# Patient Record
Sex: MALE | Race: WHITE | Employment: FULL TIME | URBAN - METROPOLITAN AREA
[De-identification: names, ages, dates, MRNs, and addresses within clinical notes are randomized per-mention and may not be internally consistent; named-entity substitution may affect disease eponyms.]

---

## 2017-01-24 ENCOUNTER — OFFICE VISIT (OUTPATIENT)
Dept: FAMILY MEDICINE CLINIC | Facility: CLINIC | Age: 37
End: 2017-01-24

## 2017-01-24 VITALS
HEART RATE: 60 BPM | SYSTOLIC BLOOD PRESSURE: 104 MMHG | DIASTOLIC BLOOD PRESSURE: 58 MMHG | TEMPERATURE: 98 F | RESPIRATION RATE: 16 BRPM | WEIGHT: 180.38 LBS | HEIGHT: 72 IN | BODY MASS INDEX: 24.43 KG/M2

## 2017-01-24 DIAGNOSIS — G89.29 CHRONIC MIDLINE LOW BACK PAIN WITHOUT SCIATICA: ICD-10-CM

## 2017-01-24 DIAGNOSIS — M54.50 CHRONIC MIDLINE LOW BACK PAIN WITHOUT SCIATICA: ICD-10-CM

## 2017-01-24 DIAGNOSIS — Z00.00 ANNUAL PHYSICAL EXAM: Primary | ICD-10-CM

## 2017-01-24 DIAGNOSIS — Z13.220 LIPID SCREENING: ICD-10-CM

## 2017-01-24 PROCEDURE — 99385 PREV VISIT NEW AGE 18-39: CPT | Performed by: FAMILY MEDICINE

## 2017-01-24 RX ORDER — OMEPRAZOLE 40 MG/1
40 CAPSULE, DELAYED RELEASE ORAL DAILY
Refills: 2 | COMMUNITY
Start: 2016-12-30 | End: 2017-11-07

## 2017-01-24 NOTE — PROGRESS NOTES
Patient presents with:  Establish Care: New Patient Has seen [facility] Physician in the past     HPI:   Anisha Fabian is a 31year old male who presents for a complete physical exam. He is a new patient, recently changed from [facility] to [facility].     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 Children: 2 boys. 5, 3yo. Exercise: active. Regular exercise 4-5 times a week. Diet: \"good\". 5 meals a day.         REVIEW OF SYSTEMS:     All systems

## 2017-01-31 ENCOUNTER — APPOINTMENT (OUTPATIENT)
Dept: LAB | Age: 37
End: 2017-01-31
Attending: FAMILY MEDICINE
Payer: COMMERCIAL

## 2017-01-31 DIAGNOSIS — Z13.220 LIPID SCREENING: ICD-10-CM

## 2017-01-31 LAB
ALBUMIN SERPL-MCNC: 4.3 G/DL (ref 3.5–4.8)
ALP LIVER SERPL-CCNC: 67 U/L (ref 45–117)
ALT SERPL-CCNC: 24 U/L (ref 17–63)
AST SERPL-CCNC: 16 U/L (ref 15–41)
BILIRUB SERPL-MCNC: 0.6 MG/DL (ref 0.1–2)
BUN BLD-MCNC: 22 MG/DL (ref 8–20)
CALCIUM BLD-MCNC: 9.5 MG/DL (ref 8.3–10.3)
CHLORIDE: 106 MMOL/L (ref 101–111)
CHOLEST SMN-MCNC: 165 MG/DL (ref ?–200)
CO2: 30 MMOL/L (ref 22–32)
CREAT BLD-MCNC: 1.04 MG/DL (ref 0.7–1.3)
GLUCOSE BLD-MCNC: 80 MG/DL (ref 70–99)
HDLC SERPL-MCNC: 58 MG/DL (ref 45–?)
HDLC SERPL: 2.84 {RATIO} (ref ?–4.97)
LDLC SERPL CALC-MCNC: 100 MG/DL (ref ?–130)
M PROTEIN MFR SERPL ELPH: 7.3 G/DL (ref 6.1–8.3)
NONHDLC SERPL-MCNC: 107 MG/DL (ref ?–130)
POTASSIUM SERPL-SCNC: 4.4 MMOL/L (ref 3.6–5.1)
SODIUM SERPL-SCNC: 140 MMOL/L (ref 136–144)
TRIGLYCERIDES: 36 MG/DL (ref ?–150)
VLDL: 7 MG/DL (ref 5–40)

## 2017-01-31 PROCEDURE — 36415 COLL VENOUS BLD VENIPUNCTURE: CPT

## 2017-01-31 PROCEDURE — 80053 COMPREHEN METABOLIC PANEL: CPT

## 2017-01-31 PROCEDURE — 80061 LIPID PANEL: CPT

## 2017-03-14 ENCOUNTER — MED REC SCAN ONLY (OUTPATIENT)
Dept: FAMILY MEDICINE CLINIC | Facility: CLINIC | Age: 37
End: 2017-03-14

## 2017-09-18 ENCOUNTER — TELEPHONE (OUTPATIENT)
Dept: FAMILY MEDICINE CLINIC | Facility: CLINIC | Age: 37
End: 2017-09-18

## 2017-09-18 NOTE — TELEPHONE ENCOUNTER
[facility] Clinical Staff Cc: Mariia Siegel [place] Referral [place]   Phone Number: [phone]             Patient Name: Guanakito Domínguez   : [birth year]   Reason for the order/referral: Foot pain on both feet   PCP: Alex Maharaj   Refer to [facility]

## 2017-09-18 NOTE — TELEPHONE ENCOUNTER
Reviewed EPIC, patient never seen for pain in feet, LOV Jan 2017 for physical, no mention of feet pain at that time, no clinical notes to add to referral requested for podiatrist. Will need appointment in our office to evaluate foot pain and develop a plan

## 2017-09-18 NOTE — TELEPHONE ENCOUNTER
Spoke with patient to let him know that [facility] need [facility] He verbalized understanding and told me he will call us back as soon as it is complete.   He was grateful that we told him so he can get it done for hi

## 2017-09-26 ENCOUNTER — OFFICE VISIT (OUTPATIENT)
Dept: FAMILY MEDICINE CLINIC | Facility: CLINIC | Age: 37
End: 2017-09-26

## 2017-09-26 VITALS
BODY MASS INDEX: 25.47 KG/M2 | SYSTOLIC BLOOD PRESSURE: 100 MMHG | HEART RATE: 64 BPM | WEIGHT: 188 LBS | DIASTOLIC BLOOD PRESSURE: 78 MMHG | TEMPERATURE: 98 F | HEIGHT: 72 IN | RESPIRATION RATE: 14 BRPM

## 2017-09-26 DIAGNOSIS — M72.2 PLANTAR FASCIA SYNDROME: Primary | ICD-10-CM

## 2017-09-26 PROCEDURE — 99213 OFFICE O/P EST LOW 20 MIN: CPT | Performed by: FAMILY MEDICINE

## 2017-09-26 NOTE — PROGRESS NOTES
Patient presents with: Foot Pain: x2 months with bilateral foot pain. Pt states worse at arch, Pain level 7/10. HPI:   Alexandre Akers is a 30year old male who presents to the office for eval of bilateral foot pain. Very active, running, yardwork.

## 2017-11-07 RX ORDER — OMEPRAZOLE 40 MG/1
40 CAPSULE, DELAYED RELEASE ORAL DAILY
Qty: 90 CAPSULE | Refills: 2 | Status: SHIPPED | OUTPATIENT
Start: 2017-11-07 | End: 2018-11-13

## 2017-11-07 NOTE — TELEPHONE ENCOUNTER
Pt requesting his Omeprazole 40 MG Oral Capsule Delayed Release med. Sam Holliday Pt not sure if appt will be needed. Sam Holliday Please let him know. Sam Lewis

## 2017-11-07 NOTE — TELEPHONE ENCOUNTER
LOV for acute 9/26/2017 with Dr Giovany Schaeffer had physical 1/24/2017 we have never ordered this before will send to Dr Giovany Schaeffer to order first RX

## 2018-01-02 ENCOUNTER — OFFICE VISIT (OUTPATIENT)
Dept: FAMILY MEDICINE CLINIC | Facility: CLINIC | Age: 38
End: 2018-01-02

## 2018-01-02 VITALS
DIASTOLIC BLOOD PRESSURE: 60 MMHG | RESPIRATION RATE: 16 BRPM | WEIGHT: 193 LBS | HEIGHT: 73 IN | BODY MASS INDEX: 25.58 KG/M2 | TEMPERATURE: 99 F | HEART RATE: 60 BPM | SYSTOLIC BLOOD PRESSURE: 100 MMHG

## 2018-01-02 DIAGNOSIS — H93.8X3 EAR PRESSURE, BILATERAL: ICD-10-CM

## 2018-01-02 DIAGNOSIS — H93.8X3 EAR PRESSURE, BILATERAL: Primary | ICD-10-CM

## 2018-01-02 PROCEDURE — 99213 OFFICE O/P EST LOW 20 MIN: CPT | Performed by: FAMILY MEDICINE

## 2018-01-02 RX ORDER — FLUTICASONE PROPIONATE 50 MCG
2 SPRAY, SUSPENSION (ML) NASAL DAILY
Qty: 1 BOTTLE | Refills: 3 | Status: SHIPPED | OUTPATIENT
Start: 2018-01-02 | End: 2018-01-02

## 2018-01-02 NOTE — PROGRESS NOTES
Patient presents with:  Ear Problem: pressure in both ears x 2 weeks, occassional dizziness, never cleared up after travel     HPI:   Maggie Valles is a 30year old male who presents to the office for ear pressure.   Recently traveled quite a bit for wor

## 2018-01-03 RX ORDER — FLUTICASONE PROPIONATE 50 MCG
SPRAY, SUSPENSION (ML) NASAL
Qty: 3 BOTTLE | Refills: 1 | Status: SHIPPED | OUTPATIENT
Start: 2018-01-03 | End: 2018-04-18 | Stop reason: ALTCHOICE

## 2018-02-05 ENCOUNTER — OFFICE VISIT (OUTPATIENT)
Dept: FAMILY MEDICINE CLINIC | Facility: CLINIC | Age: 38
End: 2018-02-05

## 2018-02-05 VITALS
RESPIRATION RATE: 14 BRPM | WEIGHT: 192 LBS | SYSTOLIC BLOOD PRESSURE: 100 MMHG | TEMPERATURE: 98 F | BODY MASS INDEX: 24.9 KG/M2 | DIASTOLIC BLOOD PRESSURE: 60 MMHG | HEIGHT: 73.8 IN | HEART RATE: 64 BPM

## 2018-02-05 DIAGNOSIS — J06.9 VIRAL URI: Primary | ICD-10-CM

## 2018-02-05 PROCEDURE — 99213 OFFICE O/P EST LOW 20 MIN: CPT | Performed by: FAMILY MEDICINE

## 2018-02-05 NOTE — PROGRESS NOTES
Patient presents with:  URI: sons recently diagnosed with Flu and Pt afraid he got it from them  Body ache and/or chills: Fatigue- first noted Saturday night     HPI:   Lexie Bello is a 30year old male who presents to the office for URI.   Has 2 boys

## 2018-04-18 ENCOUNTER — OFFICE VISIT (OUTPATIENT)
Dept: FAMILY MEDICINE CLINIC | Facility: CLINIC | Age: 38
End: 2018-04-18

## 2018-04-18 VITALS
HEART RATE: 64 BPM | HEIGHT: 72 IN | RESPIRATION RATE: 10 BRPM | WEIGHT: 190.38 LBS | SYSTOLIC BLOOD PRESSURE: 90 MMHG | TEMPERATURE: 98 F | DIASTOLIC BLOOD PRESSURE: 62 MMHG | BODY MASS INDEX: 25.78 KG/M2

## 2018-04-18 DIAGNOSIS — Z23 NEED FOR DIPHTHERIA-TETANUS-PERTUSSIS (TDAP) VACCINE: ICD-10-CM

## 2018-04-18 DIAGNOSIS — Z00.00 ANNUAL PHYSICAL EXAM: Primary | ICD-10-CM

## 2018-04-18 DIAGNOSIS — M76.52 PATELLAR TENDINITIS OF LEFT KNEE: ICD-10-CM

## 2018-04-18 PROCEDURE — 90715 TDAP VACCINE 7 YRS/> IM: CPT | Performed by: FAMILY MEDICINE

## 2018-04-18 PROCEDURE — 99395 PREV VISIT EST AGE 18-39: CPT | Performed by: FAMILY MEDICINE

## 2018-04-18 PROCEDURE — 90471 IMMUNIZATION ADMIN: CPT | Performed by: FAMILY MEDICINE

## 2018-04-18 NOTE — PROGRESS NOTES
Patient presents with:  Physical: left knee      HPI:   Katty Barragan is a 41year old male who presents for a complete physical exam.     Last colonoscopy:  n/a  Last PSA:  n/a  Immunizations: TDaP due today.   Has never had flu before, but in future co Maternal Grandfather       Social History:  Smoking status: Former Smoker                                                              Packs/day: 0.00      Years: 0.00      Smokeless tobacco: Never Used                      Alcohol use: Yes           0.0 o Patellar tendinitis of left knee  PT should help. Overuse injury.  - OP REFERRAL TO [facility]    3.  Need for diphtheria-tetanus-pertussis (Tdap) vaccine  Given.  - TETANUS, DIPHTHERIA TOXOIDS AND ACELLULAR PERTUSIS VACCINE (TDAP), >7

## 2018-05-14 ENCOUNTER — APPOINTMENT (OUTPATIENT)
Dept: PHYSICAL THERAPY | Facility: HOSPITAL | Age: 38
End: 2018-05-14
Attending: FAMILY MEDICINE
Payer: COMMERCIAL

## 2018-05-16 ENCOUNTER — APPOINTMENT (OUTPATIENT)
Dept: PHYSICAL THERAPY | Facility: HOSPITAL | Age: 38
End: 2018-05-16
Attending: FAMILY MEDICINE
Payer: COMMERCIAL

## 2018-05-23 ENCOUNTER — OFFICE VISIT (OUTPATIENT)
Dept: PHYSICAL THERAPY | Facility: HOSPITAL | Age: 38
End: 2018-05-23
Attending: FAMILY MEDICINE
Payer: COMMERCIAL

## 2018-05-23 DIAGNOSIS — M76.52 PATELLAR TENDINITIS OF LEFT KNEE: ICD-10-CM

## 2018-05-23 PROCEDURE — 97110 THERAPEUTIC EXERCISES: CPT

## 2018-05-23 PROCEDURE — 97161 PT EVAL LOW COMPLEX 20 MIN: CPT

## 2018-05-23 NOTE — PROGRESS NOTES
LOWER EXTREMITY EVALUATION:   Referring Physician: Dr. Valentine Whitmore  Date of Onset: December 2017 Date of Service: 5/23/2018   Diagnosis: Patellar tendinitis, left.   PATIENT SUMMARY:   Lorena Marquez is a 41year old y/o male who presents to therapy today wit Lt. knee  Sensation: Intact    Flexibility:   Hip Flexor: R= minimally restricted; L= minimally restricted  Hamstrings: R= -35 deg; L= -50 deg  Piriformis: R=WNL; L= Min/Mod restricted  Quads: R=WNL; L= Minimally restricted    ROM, Strength/MMT: *=discomfo Conservative modalities, and Home Exercise Program instruction    Education or treatment limitation: None  Rehab Potential: good    FOTO: 70/100(30% limitation)  Current status G Code: Initial Mobility: Walking and Moving Around CJ: 20-39% impaired, limite

## 2018-05-24 ENCOUNTER — APPOINTMENT (OUTPATIENT)
Dept: PHYSICAL THERAPY | Facility: HOSPITAL | Age: 38
End: 2018-05-24
Attending: FAMILY MEDICINE
Payer: COMMERCIAL

## 2018-05-29 ENCOUNTER — APPOINTMENT (OUTPATIENT)
Dept: PHYSICAL THERAPY | Facility: HOSPITAL | Age: 38
End: 2018-05-29
Attending: FAMILY MEDICINE
Payer: COMMERCIAL

## 2018-05-31 ENCOUNTER — APPOINTMENT (OUTPATIENT)
Dept: PHYSICAL THERAPY | Facility: HOSPITAL | Age: 38
End: 2018-05-31
Attending: FAMILY MEDICINE
Payer: COMMERCIAL

## 2018-05-31 DIAGNOSIS — M76.52 PATELLAR TENDINITIS OF LEFT KNEE: ICD-10-CM

## 2018-05-31 PROCEDURE — 97110 THERAPEUTIC EXERCISES: CPT

## 2018-05-31 NOTE — PROGRESS NOTES
Diagnosis:  Patellar tendinitis, left  [place]ized [place] of [place]  8 [place]    Next MD visit: none scheduled  Fall Risk: standard         Precautions: none   Medication Changes since last visit?: No  Subjective: Pt states left knee today is 2/10.  He states

## 2018-06-06 ENCOUNTER — OFFICE VISIT (OUTPATIENT)
Dept: PHYSICAL THERAPY | Facility: HOSPITAL | Age: 38
End: 2018-06-06
Attending: FAMILY MEDICINE
Payer: COMMERCIAL

## 2018-06-06 DIAGNOSIS — M76.52 PATELLAR TENDINITIS OF LEFT KNEE: ICD-10-CM

## 2018-06-06 PROCEDURE — 97110 THERAPEUTIC EXERCISES: CPT

## 2018-06-06 NOTE — PROGRESS NOTES
Diagnosis:  Patellar tendinitis, left  Authorized # of Visits:  8 ([facility] [place]    Next MD visit: none scheduled  Fall Risk: standard         Precautions: none   Medication Changes since last visit?: No  Subjective: Pt states left knee is doing well and denies functional mobility with stairs and running.      Charges: TEx3       Total Timed Treatment: 40 min  Total Treatment Time: 41 min

## 2018-06-07 ENCOUNTER — APPOINTMENT (OUTPATIENT)
Dept: PHYSICAL THERAPY | Facility: HOSPITAL | Age: 38
End: 2018-06-07
Attending: FAMILY MEDICINE
Payer: COMMERCIAL

## 2018-06-11 ENCOUNTER — TELEPHONE (OUTPATIENT)
Dept: FAMILY MEDICINE CLINIC | Facility: CLINIC | Age: 38
End: 2018-06-11

## 2018-06-11 NOTE — TELEPHONE ENCOUNTER
Spoke with Dr Pablo Patel. Dr Pablo Patel states there is no risk noted in this area. Dr Pablo Patel advises Pt to be alert for any symptoms, be cautious for a few weeks and to notify office if there any changes. Explained this to Pt.  Patient verbalized understanding and

## 2018-06-11 NOTE — TELEPHONE ENCOUNTER
Patient was bit by mosquitoes in [place] and is concerned because his wife is in her first trimester. Does he need to be seen?

## 2018-06-12 ENCOUNTER — TELEPHONE (OUTPATIENT)
Dept: FAMILY MEDICINE CLINIC | Facility: CLINIC | Age: 38
End: 2018-06-12

## 2018-06-12 ENCOUNTER — OFFICE VISIT (OUTPATIENT)
Dept: PHYSICAL THERAPY | Facility: HOSPITAL | Age: 38
End: 2018-06-12
Attending: FAMILY MEDICINE
Payer: COMMERCIAL

## 2018-06-12 DIAGNOSIS — M76.52 PATELLAR TENDINITIS OF LEFT KNEE: ICD-10-CM

## 2018-06-12 PROCEDURE — 97110 THERAPEUTIC EXERCISES: CPT

## 2018-06-12 NOTE — TELEPHONE ENCOUNTER
Will ask Dr Reilly Kohler about testing for the Zika virus?  Will he need to have this done at the [facility] I cannot find the testing for this in our data base

## 2018-06-12 NOTE — TELEPHONE ENCOUNTER
Called and talked to patient gave him the information we had and suggested he consider following up with the Pending sale to [facility] department.  He will consider this

## 2018-06-12 NOTE — TELEPHONE ENCOUNTER
There is currently no risk of zika virus in the [place], even [place], [place], [place]. It looks like any zika testing would be through the [facility].

## 2018-06-12 NOTE — TELEPHONE ENCOUNTER
Patient spoke to a nurse yesterday (see telephone encounter 6/11/18) in regards to mosquitoes bites. Patient is now wanting an order placed to get tested for zika virus to give him a piece of mind.

## 2018-06-12 NOTE — PROGRESS NOTES
Diagnosis:  Patellar tendinitis, left  Authorized [place] of [place]  [place] [place]    Next MD visit: none scheduled  Fall Risk: standard         Precautions: none   Medication Changes since last visit?: No  Subjective: Pt states left knee is doing well and denies Sx management and progress toward greater/painfree functional independence.      Plan: re-assess next visit    Charges: TEx3       Total Timed Treatment: 40 min  Total Treatment Time: 41 min

## 2018-06-15 ENCOUNTER — APPOINTMENT (OUTPATIENT)
Dept: PHYSICAL THERAPY | Facility: HOSPITAL | Age: 38
End: 2018-06-15
Attending: FAMILY MEDICINE
Payer: COMMERCIAL

## 2018-06-19 ENCOUNTER — APPOINTMENT (OUTPATIENT)
Dept: PHYSICAL THERAPY | Facility: HOSPITAL | Age: 38
End: 2018-06-19
Attending: FAMILY MEDICINE
Payer: COMMERCIAL

## 2018-06-22 ENCOUNTER — OFFICE VISIT (OUTPATIENT)
Dept: PHYSICAL THERAPY | Facility: HOSPITAL | Age: 38
End: 2018-06-22
Attending: FAMILY MEDICINE
Payer: COMMERCIAL

## 2018-06-22 DIAGNOSIS — M76.52 PATELLAR TENDINITIS OF LEFT KNEE: ICD-10-CM

## 2018-06-22 PROCEDURE — 97110 THERAPEUTIC EXERCISES: CPT

## 2018-06-22 NOTE — PROGRESS NOTES
Diagnosis:  Patellar tendinitis, left  Authorized # of Visits:  8 ([facility] [place]    Next MD visit: none scheduled  Fall Risk: standard         Precautions: none   Medication Changes since last visit?: No  Subjective: Pt reports feeling pretty good with no signi and no pain with activities. Able to progress exercises without increased pain however quad weakness noted. Goals:   1. Progress Lt.  Knee flxn strength to 5/5 throughout so that patient may have sufficient strength to tolerate stairs without increased p

## 2018-06-27 ENCOUNTER — OFFICE VISIT (OUTPATIENT)
Dept: PHYSICAL THERAPY | Facility: HOSPITAL | Age: 38
End: 2018-06-27
Attending: FAMILY MEDICINE
Payer: COMMERCIAL

## 2018-06-27 PROCEDURE — 97110 THERAPEUTIC EXERCISES: CPT

## 2018-07-27 NOTE — PROGRESS NOTES
PLEASE NOTE: Patient was last seen in therapy on 6/27/18. He subsequently did not show for f/u appts. Patient is being discharged from our active files at this time.

## 2018-08-17 ENCOUNTER — OFFICE VISIT (OUTPATIENT)
Dept: FAMILY MEDICINE CLINIC | Facility: CLINIC | Age: 38
End: 2018-08-17
Payer: COMMERCIAL

## 2018-08-17 VITALS
TEMPERATURE: 98 F | RESPIRATION RATE: 16 BRPM | HEIGHT: 72.25 IN | DIASTOLIC BLOOD PRESSURE: 62 MMHG | SYSTOLIC BLOOD PRESSURE: 100 MMHG | BODY MASS INDEX: 25.05 KG/M2 | WEIGHT: 187 LBS | HEART RATE: 56 BPM

## 2018-08-17 DIAGNOSIS — M25.511 ACUTE PAIN OF RIGHT SHOULDER: Primary | ICD-10-CM

## 2018-08-17 PROCEDURE — 99213 OFFICE O/P EST LOW 20 MIN: CPT | Performed by: FAMILY MEDICINE

## 2018-08-17 NOTE — PROGRESS NOTES
Patient presents with:  Shoulder Pain: R shoulder pain for the past week after doing shoulder presses with dumb bells. Has iced and taken ibuprofen with some relief.  Pain level 7 ROM is limited     HPI:   Hong Lindo is a 41year old male who presents

## 2018-09-04 ENCOUNTER — PATIENT MESSAGE (OUTPATIENT)
Dept: FAMILY MEDICINE CLINIC | Facility: CLINIC | Age: 38
End: 2018-09-04

## 2018-09-04 DIAGNOSIS — M25.511 ACUTE PAIN OF RIGHT SHOULDER: Primary | ICD-10-CM

## 2018-09-04 NOTE — TELEPHONE ENCOUNTER
From: Maggie Valles  To: Catarina Lezama MD  Sent: 9/4/2018 4:26 PM CDT  Subject: Non-Urgent Medical Question    I visited Dr. Presley Meza last week for shoulder pain per Dr. Para Fleischer referral. Dr. Presley Meza said physical therapy would be up to me and I've decide

## 2018-09-25 ENCOUNTER — HOSPITAL ENCOUNTER (OUTPATIENT)
Dept: PHYSICAL THERAPY | Facility: HOSPITAL | Age: 38
Setting detail: THERAPIES SERIES
Discharge: HOME | End: 2018-09-25
Attending: FAMILY MEDICINE
Payer: COMMERCIAL

## 2018-09-25 DIAGNOSIS — M25.511 ACUTE PAIN OF RIGHT SHOULDER: ICD-10-CM

## 2018-09-25 PROCEDURE — 97162 PT EVAL MOD COMPLEX 30 MIN: CPT

## 2018-09-25 PROCEDURE — 97530 THERAPEUTIC ACTIVITIES: CPT

## 2018-09-25 NOTE — PROGRESS NOTES
UPPER EXTREMITY EVALUATION:   Referring Physician: Dr. Bravo Isabel (PCP)  Cc: Dr. Silvana Harris (Ortho)  Diagnosis: Acute pain of right shoulder (M25.511)     Date of Service: 9/25/2018     PATIENT SUMMARY   Erasmo Burris is a 41year old y/o male who present elevation, winging/tipping, and adduction on L (hx of injury to that side in past); mild scapular tipping/winging on R  Cervical Screen:  unremarkable  Palpation: mild tenderness to palpation over bicep tendon and supraspinatus tendon on R  Sensation: unre Therapeutic Exercises; Neuromuscular Re-education; Therapeutic Activity; Kinesiology Taping; GRASTON Soft Tissue Mobilization; Pt education; Home exercise program instructions; Modalities as needed.     Education or treatment limitation: None  [facility]

## 2018-09-27 ENCOUNTER — APPOINTMENT (OUTPATIENT)
Dept: PHYSICAL THERAPY | Facility: HOSPITAL | Age: 38
End: 2018-09-27
Attending: FAMILY MEDICINE
Payer: COMMERCIAL

## 2018-10-02 ENCOUNTER — HOSPITAL ENCOUNTER (OUTPATIENT)
Dept: PHYSICAL THERAPY | Facility: HOSPITAL | Age: 38
Setting detail: THERAPIES SERIES
Discharge: HOME | End: 2018-10-02
Attending: FAMILY MEDICINE
Payer: COMMERCIAL

## 2018-10-02 PROCEDURE — 97110 THERAPEUTIC EXERCISES: CPT

## 2018-10-02 NOTE — PROGRESS NOTES
Dx: Acute pain of R shoulder         Authorized # of Visits:  NA         Next MD visit: none scheduled  Fall Risk: standard         Precautions: n/a             Subjective: Patient reports no significant change since initial evaluation last week.     Object

## 2018-10-04 ENCOUNTER — HOSPITAL ENCOUNTER (OUTPATIENT)
Dept: PHYSICAL THERAPY | Facility: HOSPITAL | Age: 38
Setting detail: THERAPIES SERIES
Discharge: HOME | End: 2018-10-04
Attending: FAMILY MEDICINE
Payer: COMMERCIAL

## 2018-10-04 PROCEDURE — 97110 THERAPEUTIC EXERCISES: CPT

## 2018-10-04 NOTE — PROGRESS NOTES
Dx: Acute pain of R shoulder         Authorized # of Visits:  NA         Next MD visit: none scheduled  Fall Risk: standard         Precautions: n/a             Subjective: Patient reports the shoulder has been doing okay. Tolerating HEP without pain.

## 2018-10-09 ENCOUNTER — HOSPITAL ENCOUNTER (OUTPATIENT)
Dept: PHYSICAL THERAPY | Facility: HOSPITAL | Age: 38
Setting detail: THERAPIES SERIES
Discharge: HOME | End: 2018-10-09
Attending: FAMILY MEDICINE
Payer: COMMERCIAL

## 2018-10-09 PROCEDURE — 97110 THERAPEUTIC EXERCISES: CPT

## 2018-10-09 NOTE — PROGRESS NOTES
Dx: Acute pain of R shoulder         Authorized # of Visits:  NA         Next MD visit: none scheduled  Fall Risk: standard         Precautions: n/a             Subjective: Patient reports shoulder has been feeling a little better and is tolerating HEP wel

## 2018-10-11 ENCOUNTER — APPOINTMENT (OUTPATIENT)
Dept: PHYSICAL THERAPY | Facility: HOSPITAL | Age: 38
End: 2018-10-11
Attending: FAMILY MEDICINE
Payer: COMMERCIAL

## 2018-10-16 ENCOUNTER — APPOINTMENT (OUTPATIENT)
Dept: PHYSICAL THERAPY | Facility: HOSPITAL | Age: 38
End: 2018-10-16
Attending: FAMILY MEDICINE
Payer: COMMERCIAL

## 2018-10-18 ENCOUNTER — HOSPITAL ENCOUNTER (OUTPATIENT)
Dept: PHYSICAL THERAPY | Facility: HOSPITAL | Age: 38
Setting detail: THERAPIES SERIES
Discharge: HOME | End: 2018-10-18
Attending: FAMILY MEDICINE
Payer: COMMERCIAL

## 2018-10-18 PROCEDURE — 97110 THERAPEUTIC EXERCISES: CPT

## 2018-11-02 ENCOUNTER — NURSE ONLY (OUTPATIENT)
Dept: FAMILY MEDICINE CLINIC | Facility: CLINIC | Age: 38
End: 2018-11-02
Payer: COMMERCIAL

## 2018-11-02 VITALS — TEMPERATURE: 99 F

## 2018-11-02 DIAGNOSIS — Z23 NEED FOR IMMUNIZATION AGAINST INFLUENZA: Primary | ICD-10-CM

## 2018-11-02 NOTE — PROGRESS NOTES
Pt came in for his flu shot. Temp checked. VIS sheet given. Consent completed. Vaccine given in right deltoid. Pt tolerated well.

## 2018-11-06 ENCOUNTER — HOSPITAL ENCOUNTER (OUTPATIENT)
Dept: PHYSICAL THERAPY | Facility: HOSPITAL | Age: 38
Setting detail: THERAPIES SERIES
Discharge: HOME | End: 2018-11-06
Attending: FAMILY MEDICINE
Payer: COMMERCIAL

## 2018-11-06 PROCEDURE — 97110 THERAPEUTIC EXERCISES: CPT

## 2018-11-06 NOTE — PROGRESS NOTES
Dx: Acute pain of R shoulder         Authorized # of Visits:  NA         Next MD visit: none scheduled  Fall Risk: standard         Precautions: n/a             Subjective: Patient reports shoulder has been doing fairly well.   Notes he still gets an occasi post capsule stretch 2x30\" 6\" box UE lsu and over x10 Body blade up/down flxn/ext 2x30\" SLB on bosu with body blade flxn/ext 2x30\" bosu SLS body blade w/ AROM: up/down, side-to-side, D2 flxn/ext 2x45\" each     3# flxn 2x10 6\" box fsu/d x10 Body blade

## 2018-11-13 RX ORDER — OMEPRAZOLE 40 MG/1
CAPSULE, DELAYED RELEASE ORAL
Qty: 90 CAPSULE | Refills: 0 | Status: SHIPPED | OUTPATIENT
Start: 2018-11-13 | End: 2018-12-20 | Stop reason: ALTCHOICE

## 2018-11-13 NOTE — TELEPHONE ENCOUNTER
Refill request sent from pharmacy for Omeprazole. LOV 08/17/18. Will you approve refill ? Routed to Dr Valentine Whitmore.

## 2018-11-14 ENCOUNTER — HOSPITAL ENCOUNTER (OUTPATIENT)
Dept: PHYSICAL THERAPY | Facility: HOSPITAL | Age: 38
Setting detail: THERAPIES SERIES
Discharge: HOME | End: 2018-11-14
Attending: FAMILY MEDICINE
Payer: COMMERCIAL

## 2018-11-14 PROCEDURE — 97110 THERAPEUTIC EXERCISES: CPT

## 2018-11-15 NOTE — PROGRESS NOTES
Dx: Acute pain of R shoulder         Authorized # of Visits:  NA         Next MD visit: none scheduled  Fall Risk: standard         Precautions: n/a             Subjective: Patient reports shoulder has been doing well and he was able to do some light overh ABC 2 sets TRX shldr flxn to plank 2x10 TRX shldr flxn to plank 2x10 9# plank row 2x10    Corner pec stretch x30\" RTB spiderman wall walk x4 laps Body blade D2 flxn/ext 2x30\" DC 15# D2 flxn 2x10 Bosu R/L shift push up 2x8 7# plank w/ alt UE ext 2x8    ho

## 2018-11-19 ENCOUNTER — APPOINTMENT (OUTPATIENT)
Dept: PHYSICAL THERAPY | Facility: HOSPITAL | Age: 38
End: 2018-11-19
Attending: FAMILY MEDICINE
Payer: COMMERCIAL

## 2018-11-20 PROCEDURE — 90471 IMMUNIZATION ADMIN: CPT | Performed by: FAMILY MEDICINE

## 2018-11-20 PROCEDURE — 90686 IIV4 VACC NO PRSV 0.5 ML IM: CPT | Performed by: FAMILY MEDICINE

## 2018-12-05 ENCOUNTER — PATIENT MESSAGE (OUTPATIENT)
Dept: FAMILY MEDICINE CLINIC | Facility: CLINIC | Age: 38
End: 2018-12-05

## 2018-12-05 DIAGNOSIS — R12 HEARTBURN: Primary | ICD-10-CM

## 2018-12-05 NOTE — TELEPHONE ENCOUNTER
From: Hong Lindo  To: Brittany Pendleton MD  Sent: 12/5/2018 4:41 PM CST  Subject: Non-Urgent Medical Question    My heartburn and indigestion have been bad lately and as you know I take 40mg Omeprazole daily for it.  No changes to my diet or health an

## 2018-12-18 ENCOUNTER — TELEPHONE (OUTPATIENT)
Dept: FAMILY MEDICINE CLINIC | Facility: CLINIC | Age: 38
End: 2018-12-18

## 2018-12-18 NOTE — TELEPHONE ENCOUNTER
TC to patient to let him know referral for Dr. Kina Lewis M.D.is still current and may return for a visit to see him.

## 2018-12-23 ENCOUNTER — PATIENT MESSAGE (OUTPATIENT)
Dept: FAMILY MEDICINE CLINIC | Facility: CLINIC | Age: 38
End: 2018-12-23

## 2018-12-23 DIAGNOSIS — M54.5 CHRONIC LOW BACK PAIN, UNSPECIFIED BACK PAIN LATERALITY, WITH SCIATICA PRESENCE UNSPECIFIED: Primary | ICD-10-CM

## 2018-12-23 DIAGNOSIS — G89.29 CHRONIC LOW BACK PAIN, UNSPECIFIED BACK PAIN LATERALITY, WITH SCIATICA PRESENCE UNSPECIFIED: Primary | ICD-10-CM

## 2018-12-26 NOTE — TELEPHONE ENCOUNTER
From: Jefferson Dumont  To: Meg Covarrubias MD  Sent: 12/23/2018 9:10 AM CST  Subject: Referral Request    Good morning -    Would you please provide a referral for me to see Dr. Ward Lopez?  He is an in-network orthopedic surgeon specializing in back

## 2019-01-04 ENCOUNTER — APPOINTMENT (OUTPATIENT)
Dept: LAB | Facility: HOSPITAL | Age: 39
End: 2019-01-04
Attending: INTERNAL MEDICINE
Payer: COMMERCIAL

## 2019-01-04 DIAGNOSIS — R17 JAUNDICE: ICD-10-CM

## 2019-01-04 LAB
ALBUMIN SERPL-MCNC: 4.1 G/DL (ref 3.1–4.5)
ALBUMIN/GLOB SERPL: 1.2 {RATIO} (ref 1–2)
ALP LIVER SERPL-CCNC: 61 U/L (ref 45–117)
ALT SERPL-CCNC: 24 U/L (ref 17–63)
ANION GAP SERPL CALC-SCNC: 4 MMOL/L (ref 0–18)
AST SERPL-CCNC: 17 U/L (ref 15–41)
BILIRUB SERPL-MCNC: 0.8 MG/DL (ref 0.1–2)
BUN BLD-MCNC: 15 MG/DL (ref 8–20)
BUN/CREAT SERPL: 16.1 (ref 10–20)
CALCIUM BLD-MCNC: 9.4 MG/DL (ref 8.3–10.3)
CHLORIDE SERPL-SCNC: 106 MMOL/L (ref 101–111)
CO2 SERPL-SCNC: 29 MMOL/L (ref 22–32)
CREAT BLD-MCNC: 0.93 MG/DL (ref 0.7–1.3)
GLOBULIN PLAS-MCNC: 3.3 G/DL (ref 2.8–4.4)
GLUCOSE BLD-MCNC: 92 MG/DL (ref 70–99)
M PROTEIN MFR SERPL ELPH: 7.4 G/DL (ref 6.4–8.2)
OSMOLALITY SERPL CALC.SUM OF ELEC: 288 MOSM/KG (ref 275–295)
POTASSIUM SERPL-SCNC: 3.9 MMOL/L (ref 3.6–5.1)
SODIUM SERPL-SCNC: 139 MMOL/L (ref 136–144)

## 2019-01-04 PROCEDURE — 80053 COMPREHEN METABOLIC PANEL: CPT

## 2019-01-04 PROCEDURE — 36415 COLL VENOUS BLD VENIPUNCTURE: CPT

## 2019-02-05 ENCOUNTER — PATIENT MESSAGE (OUTPATIENT)
Dept: FAMILY MEDICINE CLINIC | Facility: CLINIC | Age: 39
End: 2019-02-05

## 2019-02-05 DIAGNOSIS — Z30.09 VASECTOMY EVALUATION: Primary | ICD-10-CM

## 2019-02-05 NOTE — TELEPHONE ENCOUNTER
From: Enoc Blake  To: Sabi Collazo MD  Sent: 2/5/2019 4:18 PM CST  Subject: Referral Request    Hi, there -    I'd like to meet with a urologist, Dr. Laura Adkins, to discuss a vasectomy. If possible, please put in a referral for me.     Thank

## 2019-02-11 NOTE — PROGRESS NOTES
This provider is out-of our network, Referral PENDING for Dr Parth Perry approval    Vimal Cox.  [place], [address]  Office Phone: [phone]

## 2019-05-24 ENCOUNTER — TELEPHONE (OUTPATIENT)
Dept: FAMILY MEDICINE CLINIC | Facility: CLINIC | Age: 39
End: 2019-05-24

## 2019-05-24 NOTE — TELEPHONE ENCOUNTER
Patient had a vasectomy 2 months ago and is wondering if he can do the post vasectomy semen analysis with us as his surgeon is in [place] and it's getting to be a pain to get out there

## 2019-05-24 NOTE — TELEPHONE ENCOUNTER
Called and talked to patient told him that we do not do this ans besides his surgeon wants to know if the surgery has worked.  So he will continue with the surgeon

## 2019-08-09 ENCOUNTER — OFFICE VISIT (OUTPATIENT)
Dept: FAMILY MEDICINE CLINIC | Facility: CLINIC | Age: 39
End: 2019-08-09
Payer: COMMERCIAL

## 2019-08-09 VITALS
DIASTOLIC BLOOD PRESSURE: 62 MMHG | WEIGHT: 175.63 LBS | HEART RATE: 64 BPM | SYSTOLIC BLOOD PRESSURE: 108 MMHG | RESPIRATION RATE: 14 BRPM | BODY MASS INDEX: 23.79 KG/M2 | HEIGHT: 72 IN | TEMPERATURE: 98 F

## 2019-08-09 DIAGNOSIS — M54.6 CHRONIC BILATERAL THORACIC BACK PAIN: Primary | ICD-10-CM

## 2019-08-09 DIAGNOSIS — G89.29 CHRONIC BILATERAL THORACIC BACK PAIN: Primary | ICD-10-CM

## 2019-08-09 PROCEDURE — 99213 OFFICE O/P EST LOW 20 MIN: CPT | Performed by: NURSE PRACTITIONER

## 2019-08-09 RX ORDER — METHYLPREDNISOLONE 4 MG/1
TABLET ORAL
Qty: 1 KIT | Refills: 0 | Status: SHIPPED | OUTPATIENT
Start: 2019-08-09 | End: 2019-09-19 | Stop reason: ALTCHOICE

## 2019-08-09 NOTE — PROGRESS NOTES
Patient presents with:  Back Pain: Low to mid back pain      HPI:  Presents approx 8 month history of mid thoracic back pain \"right next to spine\" that is presents constantly at very low level- reports 2/10.  Stated pain seems to increase some with flexio therapy for eval and treat. Instructed to notify office if not improved with these measures or if symptoms worsen. Verbalized understanding of instructions and agreeable to this plan of care.         No orders of the defined types were placed in this encoun

## 2019-09-19 ENCOUNTER — OFFICE VISIT (OUTPATIENT)
Dept: FAMILY MEDICINE CLINIC | Facility: CLINIC | Age: 39
End: 2019-09-19
Payer: COMMERCIAL

## 2019-09-19 VITALS
DIASTOLIC BLOOD PRESSURE: 68 MMHG | SYSTOLIC BLOOD PRESSURE: 112 MMHG | BODY MASS INDEX: 24.76 KG/M2 | HEART RATE: 60 BPM | HEIGHT: 72 IN | WEIGHT: 182.81 LBS | TEMPERATURE: 98 F | RESPIRATION RATE: 14 BRPM

## 2019-09-19 DIAGNOSIS — H92.02 LEFT EAR PAIN: Primary | ICD-10-CM

## 2019-09-19 DIAGNOSIS — H91.93 BILATERAL HEARING LOSS, UNSPECIFIED HEARING LOSS TYPE: ICD-10-CM

## 2019-09-19 PROCEDURE — 99214 OFFICE O/P EST MOD 30 MIN: CPT | Performed by: NURSE PRACTITIONER

## 2019-09-19 RX ORDER — FLUTICASONE PROPIONATE 50 MCG
1 SPRAY, SUSPENSION (ML) NASAL 2 TIMES DAILY
Qty: 1 BOTTLE | Refills: 0 | Status: SHIPPED | OUTPATIENT
Start: 2019-09-19 | End: 2021-05-18

## 2019-09-19 RX ORDER — PREDNISONE 20 MG/1
40 TABLET ORAL DAILY
Qty: 10 TABLET | Refills: 0 | Status: SHIPPED | OUTPATIENT
Start: 2019-09-19 | End: 2020-12-28 | Stop reason: ALTCHOICE

## 2019-09-19 NOTE — PATIENT INSTRUCTIONS
Take prednisone 2 tabs (at the same time) daily. After complete prednisone start Flonase (fluticasone) one spray each nostril twice daily. See Dr. Tete Brown as referred.

## 2019-09-19 NOTE — PROGRESS NOTES
Patient presents with:  Ear Problem: Pt's left ear has felt \"clogged\" and tender to the touch for the past 3 days. HPI:  Presents with approx 2 month history of left ear congestion, \"clogged\" feeling, tenderness behind left ear.  Also c/o several or masses. Eyes: Conjunctivae are pink and moist without exudate or drainage. Lymphadenopathy: No anterior or posterior cervical adenopathy. Cardiovascular: Normal rate, regular rhythm. No murmur. Pulmonary/Chest: No respiratory distress.  Effort no

## 2020-06-25 PROBLEM — M77.8 TENDONITIS OF WRIST, RIGHT: Status: ACTIVE | Noted: 2020-06-25

## 2020-06-25 PROBLEM — M24.131 DEGENERATIVE TFCC TEAR, RIGHT: Status: ACTIVE | Noted: 2020-06-25

## 2020-06-25 PROBLEM — M25.642 FINGER STIFFNESS, LEFT: Status: ACTIVE | Noted: 2020-06-25

## 2020-12-15 ENCOUNTER — HOSPITAL ENCOUNTER (EMERGENCY)
Facility: HOSPITAL | Age: 40
Discharge: HOME | End: 2020-12-15
Attending: EMERGENCY MEDICINE
Payer: COMMERCIAL

## 2020-12-15 VITALS
BODY MASS INDEX: 24 KG/M2 | WEIGHT: 180 LBS | OXYGEN SATURATION: 100 % | RESPIRATION RATE: 16 BRPM | HEART RATE: 72 BPM | DIASTOLIC BLOOD PRESSURE: 88 MMHG | TEMPERATURE: 99 F | SYSTOLIC BLOOD PRESSURE: 137 MMHG

## 2020-12-15 DIAGNOSIS — S61.211A LACERATION OF LEFT INDEX FINGER WITHOUT FOREIGN BODY WITHOUT DAMAGE TO NAIL, INITIAL ENCOUNTER: Primary | ICD-10-CM

## 2020-12-15 PROCEDURE — 99283 EMERGENCY DEPT VISIT LOW MDM: CPT

## 2020-12-15 PROCEDURE — 12001 RPR S/N/AX/GEN/TRNK 2.5CM/<: CPT

## 2020-12-15 PROCEDURE — 90471 IMMUNIZATION ADMIN: CPT

## 2020-12-15 NOTE — ED PROVIDER NOTES
Patient Seen in: [facility] Emergency Department      History   Patient presents with:  Laceration/Abrasion    Stated Complaint: FINGER LACERATION    HPI    The patient is a 47-year-old previous healthy male presenting to the emergency department du deformity. No bony tenderness of the left index finger. Normal range of motion of the MCP, PIP and DIP. Distal sensation is intact per normal distal color. Cap refill is normal.  Normal gait.   Skin: There is approximately a 2 cm linear horizontally eileen

## 2020-12-16 ENCOUNTER — TELEPHONE (OUTPATIENT)
Dept: FAMILY MEDICINE CLINIC | Facility: CLINIC | Age: 40
End: 2020-12-16

## 2020-12-16 NOTE — TELEPHONE ENCOUNTER
Spoke with patient after he was seen in the ER yesterday. I reminded him that he would need to come in to get his sutures removed.  Patient stated he would call back later to make an appointment for  a visit

## 2020-12-28 ENCOUNTER — OFFICE VISIT (OUTPATIENT)
Dept: FAMILY MEDICINE CLINIC | Facility: CLINIC | Age: 40
End: 2020-12-28
Payer: COMMERCIAL

## 2020-12-28 VITALS
RESPIRATION RATE: 16 BRPM | DIASTOLIC BLOOD PRESSURE: 74 MMHG | BODY MASS INDEX: 25.73 KG/M2 | HEART RATE: 64 BPM | TEMPERATURE: 98 F | HEIGHT: 72 IN | WEIGHT: 190 LBS | SYSTOLIC BLOOD PRESSURE: 118 MMHG

## 2020-12-28 DIAGNOSIS — S61.211A LACERATION OF LEFT INDEX FINGER WITHOUT FOREIGN BODY WITHOUT DAMAGE TO NAIL, INITIAL ENCOUNTER: Primary | ICD-10-CM

## 2020-12-28 PROCEDURE — 3078F DIAST BP <80 MM HG: CPT | Performed by: FAMILY MEDICINE

## 2020-12-28 PROCEDURE — 3074F SYST BP LT 130 MM HG: CPT | Performed by: FAMILY MEDICINE

## 2020-12-28 PROCEDURE — 99213 OFFICE O/P EST LOW 20 MIN: CPT | Performed by: FAMILY MEDICINE

## 2020-12-28 PROCEDURE — 3008F BODY MASS INDEX DOCD: CPT | Performed by: FAMILY MEDICINE

## 2020-12-28 NOTE — PROGRESS NOTES
Chief Complaint:  Patient presents with:  Suture Removal: Went to UC 2 weeks ago for cut on Left Pointer finger      HPI:  This is a 47year old male patient presenting for Suture Removal (Went to UC 2 weeks ago for cut on Left Pointer finger)    13 days a distress  HEENT: atraumatic, conjunctiva clear, no obvious abnormalities on inspection   LUNGS: clear to auscultation bilaterally, no wheezes, rales or rhonchi, good air movement  CV: HRRR, no murmurs, no peripheral edema   EXTREMITIES: warm and well perfu

## 2021-03-10 ENCOUNTER — HOSPITAL ENCOUNTER (EMERGENCY)
Facility: HOSPITAL | Age: 41
Discharge: HOME | End: 2021-03-10
Attending: EMERGENCY MEDICINE
Payer: COMMERCIAL

## 2021-03-10 ENCOUNTER — APPOINTMENT (OUTPATIENT)
Dept: ULTRASOUND IMAGING | Facility: HOSPITAL | Age: 41
End: 2021-03-10
Attending: EMERGENCY MEDICINE
Payer: COMMERCIAL

## 2021-03-10 VITALS
DIASTOLIC BLOOD PRESSURE: 82 MMHG | HEART RATE: 68 BPM | TEMPERATURE: 98 F | WEIGHT: 190.06 LBS | RESPIRATION RATE: 15 BRPM | BODY MASS INDEX: 26 KG/M2 | OXYGEN SATURATION: 97 % | SYSTOLIC BLOOD PRESSURE: 137 MMHG

## 2021-03-10 DIAGNOSIS — S86.811A STRAIN OF CALF MUSCLE, RIGHT, INITIAL ENCOUNTER: Primary | ICD-10-CM

## 2021-03-10 PROCEDURE — 99284 EMERGENCY DEPT VISIT MOD MDM: CPT

## 2021-03-10 PROCEDURE — 93971 EXTREMITY STUDY: CPT | Performed by: EMERGENCY MEDICINE

## 2021-03-10 NOTE — ED PROVIDER NOTES
Patient Seen in: [facility] Emergency Department      History   Patient presents with:  Deep Vein Thrombosis    Stated Complaint: r/o dvt    HPI/Subjective:   HPI    Patient is a 29-year-old male who states that 9 days ago he was running and develop comfortably on the cart in no acute distress. HEENT: Extraocular muscles intact,   LUNGS: Lungs clear to auscultation bilaterally. CARDIOVASCULAR: + S1-S2, regular rate and rhythm, no murmurs. BACK: No CVA tenderness, no midline bony tenderness.   EXTREM

## 2021-03-10 NOTE — ED INITIAL ASSESSMENT (HPI)
Pt to ed with rpts of rt calf strain 9 days ago. Pt was seeing chiropractor who advised him to go to the ED when he called this morning stating that the pain has gotten worse and has travelled from the calf to behind the rt knee.  Pt denies hx of DVT, not r

## 2021-05-14 ENCOUNTER — TELEPHONE (OUTPATIENT)
Dept: FAMILY MEDICINE CLINIC | Facility: CLINIC | Age: 41
End: 2021-05-14

## 2021-05-14 DIAGNOSIS — Z00.00 LABORATORY EXAM ORDERED AS PART OF ROUTINE GENERAL MEDICAL EXAMINATION: Primary | ICD-10-CM

## 2021-05-14 NOTE — TELEPHONE ENCOUNTER
Please enter lab orders for the patient's upcoming physical appointment. Physical scheduled:    Your appointments     Date & Time Appointment Department [facility])    May 20, 2021  8:30 AM CDT Adult Physical with Dharmesh Hernandez MD [facility],

## 2021-05-14 NOTE — TELEPHONE ENCOUNTER
Future Appointments   Date Time Provider Linda Zavala   5/20/2021  8:30 AM Noreen Nava MD [facility] [place]     Please enter labs at [facility] for upcoming physical.    Routed to Dr Tod Avila

## 2021-05-18 ENCOUNTER — OFFICE VISIT (OUTPATIENT)
Dept: FAMILY MEDICINE CLINIC | Facility: CLINIC | Age: 41
End: 2021-05-18
Payer: COMMERCIAL

## 2021-05-18 VITALS
WEIGHT: 183.81 LBS | HEART RATE: 72 BPM | DIASTOLIC BLOOD PRESSURE: 70 MMHG | HEIGHT: 72 IN | SYSTOLIC BLOOD PRESSURE: 122 MMHG | BODY MASS INDEX: 24.89 KG/M2 | TEMPERATURE: 99 F | OXYGEN SATURATION: 99 % | RESPIRATION RATE: 16 BRPM

## 2021-05-18 DIAGNOSIS — Z00.00 ANNUAL PHYSICAL EXAM: Primary | ICD-10-CM

## 2021-05-18 DIAGNOSIS — Z56.6 STRESS AT WORK: ICD-10-CM

## 2021-05-18 DIAGNOSIS — R00.2 PALPITATION: ICD-10-CM

## 2021-05-18 PROCEDURE — 3074F SYST BP LT 130 MM HG: CPT | Performed by: FAMILY MEDICINE

## 2021-05-18 PROCEDURE — 3078F DIAST BP <80 MM HG: CPT | Performed by: FAMILY MEDICINE

## 2021-05-18 PROCEDURE — 99395 PREV VISIT EST AGE 18-39: CPT | Performed by: FAMILY MEDICINE

## 2021-05-18 PROCEDURE — 3008F BODY MASS INDEX DOCD: CPT | Performed by: FAMILY MEDICINE

## 2021-05-18 SDOH — HEALTH STABILITY - MENTAL HEALTH: OTHER PHYSICAL AND MENTAL STRAIN RELATED TO WORK: Z56.6

## 2021-05-18 NOTE — PROGRESS NOTES
Patient presents with:  Physical: Annual      HPI:   Usha Pod is a 46year old male who presents for a complete physical exam.     Last colonoscopy:  N/a  - at 50  Last PSA:  N/a - at 50  Immunizations: TDaP 2018. covid UTD x 2.      High stress fr BACK SURGERY  March 2011    L5-S1 microdiscectomy   • EGD  2011      Family History   Problem Relation Age of Onset   • Heart Disorder Maternal Grandfather       Social History:  Social History    Tobacco Use      Smoking status: Former Smoker      Smokele labs.    2. Stress at work  High stress  Impacting his sleep, causing palpitations. Already cut back on caffeine. Using exercise as a stress relief. Keep finding ways to relax when able.   If persists, might need to consider a betablocker, or anxiety me

## 2021-08-17 ENCOUNTER — HOSPITAL ENCOUNTER (OUTPATIENT)
Dept: CT IMAGING | Age: 41
Discharge: HOME | End: 2021-08-17
Attending: FAMILY MEDICINE

## 2021-08-17 DIAGNOSIS — Z13.6 SCREENING FOR HEART DISEASE: ICD-10-CM

## 2021-08-17 DIAGNOSIS — Z13.9 SCREENING PROCEDURE: ICD-10-CM

## 2021-10-11 ENCOUNTER — TELEMEDICINE (OUTPATIENT)
Dept: FAMILY MEDICINE CLINIC | Facility: CLINIC | Age: 41
End: 2021-10-11

## 2021-10-11 DIAGNOSIS — J30.2 SEASONAL ALLERGIES: Primary | ICD-10-CM

## 2021-10-11 PROCEDURE — 99213 OFFICE O/P EST LOW 20 MIN: CPT | Performed by: NURSE PRACTITIONER

## 2021-10-11 RX ORDER — FLUTICASONE PROPIONATE 50 MCG
1 SPRAY, SUSPENSION (ML) NASAL 2 TIMES DAILY
Qty: 15.8 ML | Refills: 0 | Status: SHIPPED | OUTPATIENT
Start: 2021-10-11 | End: 2021-10-18

## 2021-10-11 RX ORDER — LORATADINE 10 MG/1
10 TABLET ORAL DAILY
Qty: 30 TABLET | Refills: 0 | Status: SHIPPED | OUTPATIENT
Start: 2021-10-11

## 2021-10-11 NOTE — PROGRESS NOTES
Patient presents with:  Nasal Congestion: stuffy nose and sneezing       This visit was conducted using Telemedicine with live, two-way interactive video and audio.     Patient understands and accepts financial responsibility for any deductible, co-insuranc A/P:    Seasonal allergies  (primary encounter diagnosis)- trial loratadine and flonase BID.  Instructed to notify office if not improved in 3-4 days or if symptoms worsen, will trial prednisone burst. Verbalized understanding of instructions and agreea

## 2021-11-05 ENCOUNTER — PATIENT MESSAGE (OUTPATIENT)
Dept: FAMILY MEDICINE CLINIC | Facility: CLINIC | Age: 41
End: 2021-11-05

## 2021-11-05 NOTE — TELEPHONE ENCOUNTER
From: Izzy Sorenson  To: Last Martinez MD  Sent: 11/5/2021 8:50 AM CDT  Subject: Blood results     Hi there -    I reviewed the results on my blood tests and everything seems pretty good.  I should note that I had no concerns going into it but it was

## 2022-07-21 ENCOUNTER — TELEPHONE (OUTPATIENT)
Dept: FAMILY MEDICINE CLINIC | Facility: CLINIC | Age: 42
End: 2022-07-21

## 2022-07-21 DIAGNOSIS — Z00.00 LABORATORY EXAM ORDERED AS PART OF ROUTINE GENERAL MEDICAL EXAMINATION: Primary | ICD-10-CM

## 2022-07-21 NOTE — TELEPHONE ENCOUNTER
Please enter lab orders for the patient's upcoming physical appointment. Physical scheduled: Your appointments     Date & Time Appointment Department [facility])    Aug 31, 2022  8:30 AM CDT Adult Physical with Palmer Jim  [address]  ([address])    PLEASE NOTE - Most insurances allow a Complete Physical once every 366 days. Please schedule accordingly. Please arrive 15 minutes prior to your scheduled appointment. Please also bring your Insurance card, Photo ID, and your medication bottles or a list of your current medication. If you no longer require this appointment, please contact your physician office to cancel. Delaney [place] [id number] [address] [place] [id number] [phone]         Preferred lab: [facility]     The patient has been notified to complete fasting labs prior to their physical appointment.

## 2022-07-24 LAB
ABSOLUTE BASOPHILS: 31 CELLS/UL (ref 0–200)
ABSOLUTE EOSINOPHILS: 167 CELLS/UL (ref 15–500)
ABSOLUTE LYMPHOCYTES: 1478 CELLS/UL (ref 850–3900)
ABSOLUTE MONOCYTES: 444 CELLS/UL (ref 200–950)
ABSOLUTE NEUTROPHILS: 2279 CELLS/UL (ref 1500–7800)
ALBUMIN/GLOBULIN RATIO: 1.9 (CALC) (ref 1–2.5)
ALBUMIN: 4.6 G/DL (ref 3.6–5.1)
ALKALINE PHOSPHATASE: 62 U/L (ref 36–130)
ALT: 15 U/L (ref 9–46)
AST: 15 U/L (ref 10–40)
BASOPHILS: 0.7 %
BILIRUBIN, TOTAL: 0.6 MG/DL (ref 0.2–1.2)
BUN: 17 MG/DL (ref 7–25)
CALCIUM: 10.1 MG/DL (ref 8.6–10.3)
CARBON DIOXIDE: 29 MMOL/L (ref 20–32)
CHLORIDE: 105 MMOL/L (ref 98–110)
CHOL/HDLC RATIO: 3.8 (CALC)
CHOLESTEROL, TOTAL: 232 MG/DL
CREATININE: 0.83 MG/DL (ref 0.6–1.29)
EGFR: 113 ML/MIN/1.73M2
EOSINOPHILS: 3.8 %
GLOBULIN: 2.4 G/DL (CALC) (ref 1.9–3.7)
GLUCOSE: 96 MG/DL (ref 65–99)
HDL CHOLESTEROL: 61 MG/DL
HEMATOCRIT: 48.8 % (ref 38.5–50)
HEMOGLOBIN: 16.4 G/DL (ref 13.2–17.1)
LDL-CHOLESTEROL: 146 MG/DL (CALC)
LYMPHOCYTES: 33.6 %
MCH: 32.2 PG (ref 27–33)
MCHC: 33.6 G/DL (ref 32–36)
MCV: 95.9 FL (ref 80–100)
MONOCYTES: 10.1 %
MPV: 10.5 FL (ref 7.5–12.5)
NEUTROPHILS: 51.8 %
NON-HDL CHOLESTEROL: 171 MG/DL (CALC)
PLATELET COUNT: 197 THOUSAND/UL (ref 140–400)
POTASSIUM: 5 MMOL/L (ref 3.5–5.3)
PROTEIN, TOTAL: 7 G/DL (ref 6.1–8.1)
RDW: 12.4 % (ref 11–15)
RED BLOOD CELL COUNT: 5.09 MILLION/UL (ref 4.2–5.8)
SODIUM: 140 MMOL/L (ref 135–146)
TRIGLYCERIDES: 124 MG/DL
TSH W/REFLEX TO FT4: 3.13 MIU/L (ref 0.4–4.5)
WHITE BLOOD CELL COUNT: 4.4 THOUSAND/UL (ref 3.8–10.8)

## 2022-07-25 ENCOUNTER — TELEPHONE (OUTPATIENT)
Dept: FAMILY MEDICINE CLINIC | Facility: CLINIC | Age: 42
End: 2022-07-25

## 2022-07-25 NOTE — TELEPHONE ENCOUNTER
Pt reports \"heart flutters\" intermittently x 1 week. He feels they are more noticeable today. Denies chest pain. He states this happened about a year ago and it resolved on its own. He feels it was felt to be stress-induced at the time. He reports he is under a lot of stress now as well. Stopped drinking coffee. Trying to relax. Feels like a momentary extra beat every 5-7 minutes. \"Flutter\" only lasts a second at a time. Having some SOB with exercise. Denies feeling lightheaded or dizzy. Pulse averaging 74. No SOB at rest or with normal activity. Will plan to see pt at visit tomorrow as scheduled unless sx worsen prior. He verbalized understanding and agrees with plan.

## 2022-07-25 NOTE — TELEPHONE ENCOUNTER
Pt calling, heart flutters have been more frequent today and feeling SOB. Pt is scheduled to see Dr. Sam Adams on 7/26/22.

## 2022-07-26 ENCOUNTER — OFFICE VISIT (OUTPATIENT)
Dept: FAMILY MEDICINE CLINIC | Facility: CLINIC | Age: 42
End: 2022-07-26
Payer: COMMERCIAL

## 2022-07-26 VITALS
WEIGHT: 189 LBS | BODY MASS INDEX: 25.6 KG/M2 | SYSTOLIC BLOOD PRESSURE: 118 MMHG | OXYGEN SATURATION: 98 % | HEIGHT: 72 IN | TEMPERATURE: 97 F | HEART RATE: 79 BPM | RESPIRATION RATE: 17 BRPM | DIASTOLIC BLOOD PRESSURE: 70 MMHG

## 2022-07-26 DIAGNOSIS — R00.2 PALPITATION: ICD-10-CM

## 2022-07-26 DIAGNOSIS — Z00.00 ANNUAL PHYSICAL EXAM: Primary | ICD-10-CM

## 2022-07-26 PROBLEM — M77.8 TENDONITIS OF WRIST, RIGHT: Status: RESOLVED | Noted: 2020-06-25 | Resolved: 2022-07-26

## 2022-07-26 PROBLEM — M25.642 FINGER STIFFNESS, LEFT: Status: RESOLVED | Noted: 2020-06-25 | Resolved: 2022-07-26

## 2022-07-26 PROCEDURE — 3008F BODY MASS INDEX DOCD: CPT | Performed by: FAMILY MEDICINE

## 2022-07-26 PROCEDURE — 3074F SYST BP LT 130 MM HG: CPT | Performed by: FAMILY MEDICINE

## 2022-07-26 PROCEDURE — 93000 ELECTROCARDIOGRAM COMPLETE: CPT | Performed by: FAMILY MEDICINE

## 2022-07-26 PROCEDURE — 99213 OFFICE O/P EST LOW 20 MIN: CPT | Performed by: FAMILY MEDICINE

## 2022-07-26 PROCEDURE — 99396 PREV VISIT EST AGE 40-64: CPT | Performed by: FAMILY MEDICINE

## 2022-07-26 PROCEDURE — 3078F DIAST BP <80 MM HG: CPT | Performed by: FAMILY MEDICINE

## 2022-08-14 ENCOUNTER — HOSPITAL ENCOUNTER (OUTPATIENT)
Age: 42
Discharge: HOME | End: 2022-08-14
Payer: COMMERCIAL

## 2022-08-14 VITALS
BODY MASS INDEX: 24.52 KG/M2 | RESPIRATION RATE: 14 BRPM | WEIGHT: 185 LBS | SYSTOLIC BLOOD PRESSURE: 114 MMHG | OXYGEN SATURATION: 97 % | HEART RATE: 72 BPM | DIASTOLIC BLOOD PRESSURE: 67 MMHG | HEIGHT: 73 IN
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
## 2022-08-14 DIAGNOSIS — L03.90 CELLULITIS, UNSPECIFIED CELLULITIS SITE: Primary | ICD-10-CM

## 2022-08-14 PROCEDURE — 87205 SMEAR GRAM STAIN: CPT | Performed by: NURSE PRACTITIONER

## 2022-08-14 PROCEDURE — 99213 OFFICE O/P EST LOW 20 MIN: CPT | Performed by: NURSE PRACTITIONER

## 2022-08-14 PROCEDURE — 87070 CULTURE OTHR SPECIMN AEROBIC: CPT | Performed by: NURSE PRACTITIONER

## 2022-08-14 RX ORDER — CEPHALEXIN 500 MG/1
500 CAPSULE ORAL 4 TIMES DAILY
Qty: 28 CAPSULE | Refills: 0 | Status: SHIPPED | OUTPATIENT
Start: 2022-08-14 | End: 2022-08-21

## 2022-08-14 NOTE — ED INITIAL ASSESSMENT (HPI)
Patient reports he noticed possible bug bite behind left knee 2 days ago after golfing. Reports slight itching to area. Redness and swelling observed to area. Drainage observed.

## 2023-01-11 ENCOUNTER — OFFICE VISIT (OUTPATIENT)
Dept: FAMILY MEDICINE CLINIC | Facility: CLINIC | Age: 43
End: 2023-01-11
Payer: COMMERCIAL

## 2023-01-11 VITALS
WEIGHT: 191 LBS | RESPIRATION RATE: 12 BRPM | HEIGHT: 72 IN | HEART RATE: 64 BPM | OXYGEN SATURATION: 98 % | SYSTOLIC BLOOD PRESSURE: 98 MMHG | TEMPERATURE: 98 F | DIASTOLIC BLOOD PRESSURE: 72 MMHG | BODY MASS INDEX: 25.87 KG/M2

## 2023-01-11 DIAGNOSIS — J02.9 SORE THROAT: Primary | ICD-10-CM

## 2023-01-11 DIAGNOSIS — Z20.818 STREP THROAT EXPOSURE: ICD-10-CM

## 2023-01-11 LAB
CONTROL LINE PRESENT WITH A CLEAR BACKGROUND (YES/NO): YES YES/NO
KIT EXPIRATION DATE: NORMAL DATE
STREP GRP A CUL-SCR: NEGATIVE

## 2023-01-11 PROCEDURE — 3008F BODY MASS INDEX DOCD: CPT

## 2023-01-11 PROCEDURE — 3078F DIAST BP <80 MM HG: CPT

## 2023-01-11 PROCEDURE — 87880 STREP A ASSAY W/OPTIC: CPT

## 2023-01-11 PROCEDURE — 99213 OFFICE O/P EST LOW 20 MIN: CPT

## 2023-01-11 PROCEDURE — 3074F SYST BP LT 130 MM HG: CPT

## 2023-02-15 ENCOUNTER — PATIENT MESSAGE (OUTPATIENT)
Dept: FAMILY MEDICINE CLINIC | Facility: CLINIC | Age: 43
End: 2023-02-15

## 2023-02-15 NOTE — TELEPHONE ENCOUNTER
From: Gilford Nan  To: Anson Gardner MD  Sent: 2/15/2023 4:39 PM CST  Subject: Clogged ears & altitude prepartion    Hi there -    Looking for an OTC suggestion. I've had stuffy head/ears lately and need to dry it out. Dr Nicolas Serna recommended something years back when I came in for more pronounced symptoms and he observed I had a tear in my left ear drum. No issues with my ear now, but hoping you can suggest something - I fly out to [facility] next week to go skiing and don't want it to get worse from plane rides or elevation. Also, regarding elevation, I'll be skiing at 9,000-11,000 feet. What are some tips to help me better adapt to altitude change in advance?

## 2023-02-16 RX ORDER — LORATADINE 10 MG/1
10 TABLET ORAL DAILY
Qty: 30 TABLET | Refills: 0 | Status: SHIPPED | OUTPATIENT
Start: 2023-02-16

## 2023-04-27 ENCOUNTER — PATIENT MESSAGE (OUTPATIENT)
Dept: FAMILY MEDICINE CLINIC | Facility: CLINIC | Age: 43
End: 2023-04-27

## 2023-04-27 DIAGNOSIS — Z00.00 LABORATORY EXAM ORDERED AS PART OF ROUTINE GENERAL MEDICAL EXAMINATION: Primary | ICD-10-CM

## 2023-05-25 ENCOUNTER — APPOINTMENT (OUTPATIENT)
Dept: ULTRASOUND IMAGING | Age: 43
End: 2023-05-25
Attending: EMERGENCY MEDICINE
Payer: COMMERCIAL

## 2023-05-25 ENCOUNTER — HOSPITAL ENCOUNTER (OUTPATIENT)
Age: 43
Discharge: HOME | End: 2023-05-25
Attending: EMERGENCY MEDICINE
Payer: COMMERCIAL

## 2023-05-25 VITALS
OXYGEN SATURATION: 99 % | HEART RATE: 57 BPM | TEMPERATURE: 98 F | DIASTOLIC BLOOD PRESSURE: 79 MMHG | HEIGHT: 72 IN | WEIGHT: 180 LBS | SYSTOLIC BLOOD PRESSURE: 128 MMHG | BODY MASS INDEX: 24.38 KG/M2 | RESPIRATION RATE: 18 BRPM

## 2023-05-25 DIAGNOSIS — S80.10XA: Primary | ICD-10-CM

## 2023-05-25 PROCEDURE — 99214 OFFICE O/P EST MOD 30 MIN: CPT

## 2023-05-25 PROCEDURE — 93971 EXTREMITY STUDY: CPT | Performed by: EMERGENCY MEDICINE

## 2023-05-25 NOTE — DISCHARGE INSTRUCTIONS
Activity as tolerated. Ice for 30 minutes at bedtime. Ibuprofen up to 600 mg every 6 hours as needed for pain.

## 2023-05-25 NOTE — ED INITIAL ASSESSMENT (HPI)
Patient presents to IC with c/o left posterior calg/leg injury from last nights hockey game when he got slammed by a hockey stick. Today swollen and tight.+cms.

## 2023-07-17 NOTE — PROGRESS NOTES
Dx: Acute pain of R shoulder         Authorized # of Visits:  NA         Next MD visit: none scheduled  Fall Risk: standard         Precautions: n/a             Subjective: Patient reports shoulder has been doing pretty good.   Notes he had some pain in the x10 Body blade hoirz abd/add side-to-side 2x30\"       2# scaption 2x10 TRX push up 2x10 6\" UE lsu and over 2x10         6\" fsu/d 2x10                  Charges: 3 ANNMARIE  Total Timed Treatment: 45 min     Total Treatment Time: 45 min English

## 2023-07-24 ENCOUNTER — PATIENT MESSAGE (OUTPATIENT)
Dept: FAMILY MEDICINE CLINIC | Facility: CLINIC | Age: 43
End: 2023-07-24

## 2023-07-24 NOTE — TELEPHONE ENCOUNTER
From: Annabelle Goodman  To: Darinel Chavez MD  Sent: 7/24/2023 2:10 PM CDT  Subject: Blood panel order    Hi there -    I am due for annual exam and would like to have bloodwork complete. Can Dr. Madonna Rodriguez submit an order for me?  I will have this done before our visit in August. Thanks

## 2023-07-25 ENCOUNTER — LAB ENCOUNTER (OUTPATIENT)
Dept: LAB | Facility: HOSPITAL | Age: 43
End: 2023-07-25
Attending: FAMILY MEDICINE
Payer: COMMERCIAL

## 2023-07-25 DIAGNOSIS — Z00.00 LABORATORY EXAM ORDERED AS PART OF ROUTINE GENERAL MEDICAL EXAMINATION: ICD-10-CM

## 2023-07-25 LAB
ALBUMIN SERPL-MCNC: 3.9 G/DL (ref 3.4–5)
ALBUMIN/GLOB SERPL: 1.2 {RATIO} (ref 1–2)
ALP LIVER SERPL-CCNC: 57 U/L
ALT SERPL-CCNC: 24 U/L
ANION GAP SERPL CALC-SCNC: 3 MMOL/L (ref 0–18)
AST SERPL-CCNC: 17 U/L (ref 15–37)
BILIRUB SERPL-MCNC: 0.8 MG/DL (ref 0.1–2)
BUN BLD-MCNC: 14 MG/DL (ref 7–18)
CALCIUM BLD-MCNC: 9.3 MG/DL (ref 8.5–10.1)
CHLORIDE SERPL-SCNC: 108 MMOL/L (ref 98–112)
CHOLEST SERPL-MCNC: 214 MG/DL (ref ?–200)
CO2 SERPL-SCNC: 29 MMOL/L (ref 21–32)
CREAT BLD-MCNC: 0.89 MG/DL
EGFRCR SERPLBLD CKD-EPI 2021: 110 ML/MIN/1.73M2 (ref 60–?)
ERYTHROCYTE [DISTWIDTH] IN BLOOD BY AUTOMATED COUNT: 11.9 %
FASTING PATIENT LIPID ANSWER: YES
FASTING STATUS PATIENT QL REPORTED: YES
GLOBULIN PLAS-MCNC: 3.2 G/DL (ref 2.8–4.4)
GLUCOSE BLD-MCNC: 93 MG/DL (ref 70–99)
HCT VFR BLD AUTO: 46.4 %
HDLC SERPL-MCNC: 59 MG/DL (ref 40–59)
HGB BLD-MCNC: 16.1 G/DL
LDLC SERPL CALC-MCNC: 132 MG/DL (ref ?–100)
MCH RBC QN AUTO: 32.1 PG (ref 26–34)
MCHC RBC AUTO-ENTMCNC: 34.7 G/DL (ref 31–37)
MCV RBC AUTO: 92.4 FL
NONHDLC SERPL-MCNC: 155 MG/DL (ref ?–130)
OSMOLALITY SERPL CALC.SUM OF ELEC: 290 MOSM/KG (ref 275–295)
PLATELET # BLD AUTO: 205 10(3)UL (ref 150–450)
POTASSIUM SERPL-SCNC: 4.7 MMOL/L (ref 3.5–5.1)
PROT SERPL-MCNC: 7.1 G/DL (ref 6.4–8.2)
RBC # BLD AUTO: 5.02 X10(6)UL
SODIUM SERPL-SCNC: 140 MMOL/L (ref 136–145)
TRIGL SERPL-MCNC: 128 MG/DL (ref 30–149)
TSI SER-ACNC: 3.05 MIU/ML (ref 0.36–3.74)
VLDLC SERPL CALC-MCNC: 23 MG/DL (ref 0–30)
WBC # BLD AUTO: 4.9 X10(3) UL (ref 4–11)

## 2023-07-25 PROCEDURE — 80061 LIPID PANEL: CPT

## 2023-07-25 PROCEDURE — 80053 COMPREHEN METABOLIC PANEL: CPT

## 2023-07-25 PROCEDURE — 85027 COMPLETE CBC AUTOMATED: CPT

## 2023-07-25 PROCEDURE — 36415 COLL VENOUS BLD VENIPUNCTURE: CPT

## 2023-07-25 PROCEDURE — 84443 ASSAY THYROID STIM HORMONE: CPT

## 2023-07-27 ENCOUNTER — PATIENT MESSAGE (OUTPATIENT)
Dept: FAMILY MEDICINE CLINIC | Facility: CLINIC | Age: 43
End: 2023-07-27

## 2023-07-27 ENCOUNTER — OFFICE VISIT (OUTPATIENT)
Dept: FAMILY MEDICINE CLINIC | Facility: CLINIC | Age: 43
End: 2023-07-27
Payer: COMMERCIAL

## 2023-07-27 ENCOUNTER — HOSPITAL ENCOUNTER (OUTPATIENT)
Dept: GENERAL RADIOLOGY | Facility: HOSPITAL | Age: 43
Discharge: HOME | End: 2023-07-27
Attending: NURSE PRACTITIONER
Payer: COMMERCIAL

## 2023-07-27 VITALS
HEART RATE: 64 BPM | SYSTOLIC BLOOD PRESSURE: 118 MMHG | DIASTOLIC BLOOD PRESSURE: 80 MMHG | RESPIRATION RATE: 16 BRPM | OXYGEN SATURATION: 98 % | TEMPERATURE: 98 F | BODY MASS INDEX: 24.52 KG/M2 | WEIGHT: 181 LBS | HEIGHT: 72 IN

## 2023-07-27 DIAGNOSIS — M79.674 PAIN OF RIGHT GREAT TOE: ICD-10-CM

## 2023-07-27 DIAGNOSIS — M54.50 ACUTE RIGHT-SIDED LOW BACK PAIN WITHOUT SCIATICA: Primary | ICD-10-CM

## 2023-07-27 PROCEDURE — 73660 X-RAY EXAM OF TOE(S): CPT | Performed by: NURSE PRACTITIONER

## 2023-07-27 PROCEDURE — 3079F DIAST BP 80-89 MM HG: CPT | Performed by: NURSE PRACTITIONER

## 2023-07-27 PROCEDURE — 3074F SYST BP LT 130 MM HG: CPT | Performed by: NURSE PRACTITIONER

## 2023-07-27 PROCEDURE — 3008F BODY MASS INDEX DOCD: CPT | Performed by: NURSE PRACTITIONER

## 2023-07-27 PROCEDURE — 99213 OFFICE O/P EST LOW 20 MIN: CPT | Performed by: NURSE PRACTITIONER

## 2023-07-27 RX ORDER — PREDNISONE 20 MG/1
40 TABLET ORAL DAILY
Qty: 10 TABLET | Refills: 0 | Status: SHIPPED | OUTPATIENT
Start: 2023-07-27

## 2023-07-27 NOTE — TELEPHONE ENCOUNTER
From: Karlyne Schwab  To: Sahra Mendoza NP  Sent: 7/27/2023 11:59 AM CDT  Subject: Prednizone Rx    Hi there -    Did you call in a prescription for me?

## 2023-07-31 ENCOUNTER — OFFICE VISIT (OUTPATIENT)
Dept: FAMILY MEDICINE CLINIC | Facility: CLINIC | Age: 43
End: 2023-07-31
Payer: COMMERCIAL

## 2023-07-31 VITALS
HEART RATE: 60 BPM | OXYGEN SATURATION: 98 % | HEIGHT: 72 IN | WEIGHT: 185.13 LBS | BODY MASS INDEX: 25.07 KG/M2 | DIASTOLIC BLOOD PRESSURE: 76 MMHG | SYSTOLIC BLOOD PRESSURE: 126 MMHG

## 2023-07-31 DIAGNOSIS — R09.89 THROAT CLEARING: ICD-10-CM

## 2023-07-31 DIAGNOSIS — J30.2 SEASONAL ALLERGIES: ICD-10-CM

## 2023-07-31 DIAGNOSIS — Z00.00 ANNUAL PHYSICAL EXAM: Primary | ICD-10-CM

## 2023-07-31 DIAGNOSIS — Z80.0 FAMILY HISTORY OF COLON CANCER: ICD-10-CM

## 2023-07-31 PROCEDURE — 99396 PREV VISIT EST AGE 40-64: CPT | Performed by: FAMILY MEDICINE

## 2023-07-31 PROCEDURE — 3074F SYST BP LT 130 MM HG: CPT | Performed by: FAMILY MEDICINE

## 2023-07-31 PROCEDURE — 3078F DIAST BP <80 MM HG: CPT | Performed by: FAMILY MEDICINE

## 2023-07-31 PROCEDURE — 3008F BODY MASS INDEX DOCD: CPT | Performed by: FAMILY MEDICINE

## 2023-11-07 ENCOUNTER — TELEPHONE (OUTPATIENT)
Dept: ORTHOPEDICS CLINIC | Facility: CLINIC | Age: 43
End: 2023-11-07

## 2023-11-07 DIAGNOSIS — M25.511 RIGHT SHOULDER PAIN, UNSPECIFIED CHRONICITY: Primary | ICD-10-CM

## 2023-11-07 NOTE — TELEPHONE ENCOUNTER
Xray ordered per Ortho protocol  Mychart message sent to patient to arrive 15 - 20 min early to complete imaging.

## 2023-11-09 ENCOUNTER — HOSPITAL ENCOUNTER (OUTPATIENT)
Dept: GENERAL RADIOLOGY | Age: 43
Discharge: HOME | End: 2023-11-09
Attending: ORTHOPAEDIC SURGERY
Payer: COMMERCIAL

## 2023-11-09 ENCOUNTER — OFFICE VISIT (OUTPATIENT)
Dept: ORTHOPEDICS CLINIC | Facility: CLINIC | Age: 43
End: 2023-11-09
Payer: COMMERCIAL

## 2023-11-09 DIAGNOSIS — M75.101 ROTATOR CUFF SYNDROME, RIGHT: Primary | ICD-10-CM

## 2023-11-09 DIAGNOSIS — M75.41 IMPINGEMENT SYNDROME OF RIGHT SHOULDER: ICD-10-CM

## 2023-11-09 DIAGNOSIS — M25.511 RIGHT SHOULDER PAIN, UNSPECIFIED CHRONICITY: ICD-10-CM

## 2023-11-09 PROCEDURE — 99203 OFFICE O/P NEW LOW 30 MIN: CPT | Performed by: ORTHOPAEDIC SURGERY

## 2023-11-09 PROCEDURE — 73030 X-RAY EXAM OF SHOULDER: CPT | Performed by: ORTHOPAEDIC SURGERY

## 2023-11-09 NOTE — PROGRESS NOTES
[facility]    Chief Complaint   Patient presents with    Follow - Up     Rt Shoulder Pain   - no new or worsening symptoms  - pain and decreased mobility since the last visit is the same   - has tingling sensation on the right pinky and ring finger        HPI: The patient is a 41year old male returning with complaints of mild intermittent pain at his right shoulder. It is fairly reminiscent of his previous symptoms of impingement. He relates onset possibly from increased activities this summer including golf and home improvement projects including overhead lifting of drywall. Pain is localized to the superior and anterior lateral aspect of this right shoulder with mild restrictions in motion but no analilia weakness. He has a history of at least 1 injection to the shoulder and has undergone formal therapy in the past.  He has had some episodes of tingling into the fourth and fifth digits although he is unsure if this is related to his shoulder. He denies neck pain. Past Medical History:   Diagnosis Date    Esophageal reflux     Heartburn      Past Surgical History:   Procedure Laterality Date    BACK SURGERY  March 2011    L5-S1 microdiscectomy    EGD  2011     Current Outpatient Medications   Medication Sig Dispense Refill    predniSONE 20 MG Oral Tab Take 2 tablets (40 mg total) by mouth daily. 10 tablet 0    OMEPRAZOLE 20 MG Oral Capsule Delayed Release TAKE 1 CAPSULE(20 MG) BY MOUTH BEFORE BREAKFAST 90 capsule 3    loratadine 10 MG Oral Tab Take 1 tablet (10 mg total) by mouth daily.  30 tablet 0     No Known Allergies  Family History   Problem Relation Age of Onset    Cancer Father         lymphoma    Prostate Cancer Father     Alcohol abuse Sister     Bipolar Disorder Sister     Heart Disorder Maternal Grandfather     Colon Cancer Maternal Grandfather      Social History     Occupational History    Not on file   Tobacco Use    Smoking status: Former    Smokeless tobacco: Never   Vaping Use    Vaping Use: Never used   Substance and Sexual Activity    Alcohol use: Yes     Alcohol/week: 6.0 standard drinks of alcohol     Types: 6 Cans of beer per week     Comment: 5x weekly    Drug use: No    Sexual activity: Not on file        ROS:  Complete ROS reviewed by me and non-contributory to the chief complaint except as mentioned above. Physical Exam:    There were no vitals taken for this visit. Constitutional: Well developed, well nourished pleasant 41year old male  Psychological: NAD, alert and appropriate  Respiratory: Breathing comfortably on room air with RR of 12-16  Cardiac: Palpable distal pulses with pink warm extremities distally  Examination of the C-spine reveals no palpable tenderness and adequate active range of motion with minimal complaint. Right shoulder reveals no visible swelling, discoloration or deformity. Active range of motion generates pain through the impingement zone on forward elevation to approximately 165 degrees. Similar findings are noted on abduction through the impingement zone to 140. External rotation is symmetrical at about 55 degrees bilaterally with internal rotation just above the belt line which is reduced in comparison to the left which is to about T10. Hawkin's test is painful anterior laterally with less pain on Speed's test and a well-tolerated [place]'s test.  Biceps groove is minimally tender. AC joint is mildly prominent but nontender with adequate tolerance of crossarm adduction. Rotator cuff strength test reveals intact full symmetrical power in all planes. No instability on sulcus or load-and-shift. Hand, wrist and elbow range of motion is within normal limits. Distal neurovascular status is intact on sensory, motor and perfusion assessment. Imaging: Multiple views right shoulder personally viewed, demonstrating no acute fracture dislocation or late glenohumeral degenerative changes.   Mild degenerative changes and narrowing seen at the [facility] joint.    XR SHOULDER, COMPLETE (MIN 2 VIEWS), RIGHT (CPT=73030)    Result Date: 11/9/2023  CONCLUSION:  The examination is within normal limits. LOCATION:  [facility]   Dictated by (CST): Azra Philip DO on 11/09/2023 at 1:47 PM     Finalized by (CST): Azra Philip DO on 11/09/2023 at 1:47 PM          Assessment/Diagnoses:  Diagnoses and all orders for this visit:    Rotator cuff syndrome, right    Impingement syndrome of right shoulder      Plan: I reviewed imaging and exam findings with the patient. Symptoms are consistent with rotator cuff tendinitis and impingement. Referencing the patient's imaging studies, I explained the diagnosis, etiology and natural history of rotator cuff tendinitis and impingement. Given the short duration of symptoms and moderate findings on exam, I would recommend physical therapy for scapular stabilization exercises and rotator cuff strengthening, followed by a home program.  Activity modification, anti-inflammatories and the prescribed physical therapy regimen should be effective over the course of 6 to 8 weeks. Patient relates that he has had physical therapy in the past and has instruction sheets plus Thera-Band's. He will therefore initiate his treatment independently and contact us if he is interested in a formal physical therapy prescription. If symptoms do not respond reassessment is advised. All questions were answered and the patient expressed understanding and appreciation. Magalie Dooley MD, [place] [address] Medicine/Knee and Shoulder  [facility] Department of Orthopaedics  Phone [phone]  Fax [phone]      This document was partially prepared using W[employer] voice recognition software.   Although every attempt is made to correct errors during dictation, discrepancies may still exist.

## 2023-12-22 ENCOUNTER — PATIENT MESSAGE (OUTPATIENT)
Dept: ORTHOPEDICS CLINIC | Facility: CLINIC | Age: 43
End: 2023-12-22

## 2023-12-22 NOTE — TELEPHONE ENCOUNTER
Patient brought MRI disk to [facility] location on 12/22/23 to have the MAs present upload it into the PACs system. Patient stated that they have the written reports being faxed over from [address], as well.

## 2023-12-22 NOTE — TELEPHONE ENCOUNTER
From: Leno Johnson  To: Gilmeranna Rivera  Sent: 12/22/2023 12:46 PM CST  Subject: MRI Results    I stopped by the [facility]  office today and had the staff copy my MRI results for Dr Terrence Mendes to rjw. Please let him know.  Thanks

## 2024-01-10 ENCOUNTER — OFFICE VISIT (OUTPATIENT)
Dept: ORTHOPEDICS CLINIC | Facility: CLINIC | Age: 44
End: 2024-01-10
Payer: COMMERCIAL

## 2024-01-10 VITALS — WEIGHT: 185 LBS | HEIGHT: 72 IN | BODY MASS INDEX: 25.06 KG/M2

## 2024-01-10 DIAGNOSIS — M75.51 SUBDELTOID BURSITIS OF RIGHT SHOULDER JOINT: ICD-10-CM

## 2024-01-10 DIAGNOSIS — M75.41 INTERNAL IMPINGEMENT OF RIGHT SHOULDER: Primary | ICD-10-CM

## 2024-01-10 DIAGNOSIS — S43.431A DEGENERATIVE SUPERIOR LABRAL ANTERIOR-TO-POSTERIOR (SLAP) TEAR OF RIGHT SHOULDER: ICD-10-CM

## 2024-01-10 PROCEDURE — 99214 OFFICE O/P EST MOD 30 MIN: CPT | Performed by: ORTHOPAEDIC SURGERY

## 2024-01-10 PROCEDURE — 3008F BODY MASS INDEX DOCD: CPT | Performed by: ORTHOPAEDIC SURGERY

## 2024-01-10 NOTE — PROGRESS NOTES
[facility]    Chief Complaint   Patient presents with    Follow - Up     RT SHOULDER PAIN; MRI RESULTS     HPI: The patient is a 43 year old male returning with complaints of mild intermittent pain at his right shoulder.  He is struggled with intermittent symptoms of impingement over the years.  He has had follow-up at an outside office where MRI arthrogram was recently obtained.  He remains fairly active with home projects, golf and overhead activities including throwing with his 3 sons who play recreational baseball.  He denies instability, neck pain, catching, locking or weakness about the shoulder.  Recent MRI with arthrogram results are available on CD for our review.    Past Medical History:   Diagnosis Date    Esophageal reflux     Heartburn      Past Surgical History:   Procedure Laterality Date    BACK SURGERY  March 2011    L5-S1 microdiscectomy    EGD  2011     Current Outpatient Medications   Medication Sig Dispense Refill    predniSONE 20 MG Oral Tab Take 2 tablets (40 mg total) by mouth daily. 10 tablet 0    OMEPRAZOLE 20 MG Oral Capsule Delayed Release TAKE 1 CAPSULE(20 MG) BY MOUTH BEFORE BREAKFAST 90 capsule 3    loratadine 10 MG Oral Tab Take 1 tablet (10 mg total) by mouth daily. 30 tablet 0     No Known Allergies  Family History   Problem Relation Age of Onset    Cancer Father         lymphoma    Prostate Cancer Father     Alcohol abuse Sister     Bipolar Disorder Sister     Heart Disorder Maternal Grandfather     Colon Cancer Maternal Grandfather      Social History     Occupational History    Not on file   Tobacco Use    Smoking status: Former    Smokeless tobacco: Never   Vaping Use    Vaping Use: Never used   Substance and Sexual Activity    Alcohol use: Yes     Alcohol/week: 6.0 standard drinks of alcohol     Types: 6 Cans of beer per week     Comment: 5x weekly    Drug use: No    Sexual activity: Not on file        ROS:  Complete ROS reviewed by me and non-contributory to  the chief complaint except as mentioned above.    Physical Exam:    Ht 6' (1.829 m)   Wt 185 lb (83.9 kg)   BMI 25.09 kg/m²   Constitutional: Well developed, well nourished pleasant 43 year old male  Psychological: NAD, alert and appropriate  Respiratory: Breathing comfortably on room air with RR of 12-16  Cardiac: Palpable distal pulses with pink warm extremities distally  Examination of the C-spine reveals no palpable tenderness and adequate active range of motion with minimal complaint.  Right shoulder reveals no visible swelling, discoloration or deformity.  Active range of motion generates pain through the impingement zone on forward elevation to approximately 165 degrees.  Similar findings are noted on abduction through the impingement zone to 140.  External rotation is symmetrical at about 55 degrees bilaterally with internal rotation just above the belt line which is reduced in comparison to the left which is to about T10.  Hawkin's test is painful anterior laterally with less pain on Speed's test and a well-tolerated [place]'s test.  Biceps groove is minimally tender.  AC joint is mildly prominent but nontender with adequate tolerance of crossarm adduction.  Rotator cuff strength test reveals intact full symmetrical power in all planes.  No instability on sulcus or load-and-shift.  Hand, wrist and elbow range of motion is within normal limits.  Distal neurovascular status is intact on sensory, motor and perfusion assessment.    Imaging: Multiple views right shoulder 11/9/2023 personally viewed, demonstrating no acute fracture dislocation or late glenohumeral degenerative changes.  Mild degenerative changes and narrowing seen at the AC joint.    MRI arthrogram obtained from an outside physician through [facility] 12/21/2023 identified shallow posterior superior signal at the labrum consistent with a SLAP tear with no obvious cartilaginous defects.  Mild undersurface bursal sided fraying of  the infraspinatus is noted.  No full-thickness rotator cuff tear or biceps tendon involvement is seen.  Mild subdeltoid bursitis and moderate AC joint arthropathy is also described.    Assessment/Diagnoses:  Diagnoses and all orders for this visit:    Internal impingement of right shoulder    Subdeltoid bursitis of right shoulder joint    Degenerative superior labral anterior-to-posterior (SLAP) tear of right shoulder      Plan: I reviewed send MRI and exam findings with the patient.  Symptoms are consistent with rotator cuff tendinitis and internal impingement associated with overhead activities.  Given no full-thickness rotator cuff tearing or unstable appearing high-grade SLAP tear I would recommend conservative management.  He is presenting with symptoms of internal impingement which may benefit from posterior capsular stretching or internal rotation joint mobilization exercises.  These are specifically outlined.  He has had formal therapy in the past as well as subacromial injection.  For now.  I think he has a chance of improving with nonoperative care although arthroscopic evaluation, debridement and possible repair could be considered if conservative measures fail.  Decompression and bursectomy would also be performed in the same setting.  I told him frankly that this is not indicated unless his symptoms progress despite exhaustive nonoperative care.  Repeat injections could be considered but also can weaken normal collagen based soft tissues.  I therefore suggested that we hold off on this as well if he can tolerate activities of daily living and incorporate the recommendations for conservative care.  All questions were answered and he verbalized understanding and appreciation.  If symptoms do not slowly rose, reassessment is advised.    Magalie Silverman MD, FAAOS  Sports Medicine/Knee and Shoulder  [place] Department of Orthopaedics  Phone [phone]  Fax [phone]      This document was partially  prepared using Dragon Medical voice recognition software.  Although every attempt is made to correct errors during dictation, discrepancies may still exist.

## 2024-01-13 ENCOUNTER — HOSPITAL ENCOUNTER (OUTPATIENT)
Age: 44
Discharge: HOME | End: 2024-01-13
Payer: COMMERCIAL

## 2024-01-13 ENCOUNTER — APPOINTMENT (OUTPATIENT)
Dept: GENERAL RADIOLOGY | Age: 44
End: 2024-01-13
Attending: PHYSICIAN ASSISTANT
Payer: COMMERCIAL

## 2024-01-13 VITALS
SYSTOLIC BLOOD PRESSURE: 129 MMHG | TEMPERATURE: 98 F | BODY MASS INDEX: 25.06 KG/M2 | HEIGHT: 72 IN | WEIGHT: 185 LBS | OXYGEN SATURATION: 100 % | RESPIRATION RATE: 15 BRPM | HEART RATE: 65 BPM | DIASTOLIC BLOOD PRESSURE: 72 MMHG

## 2024-01-13 DIAGNOSIS — S83.412A SPRAIN OF MEDIAL COLLATERAL LIGAMENT OF LEFT KNEE, INITIAL ENCOUNTER: Primary | ICD-10-CM

## 2024-01-13 PROCEDURE — 73562 X-RAY EXAM OF KNEE 3: CPT | Performed by: PHYSICIAN ASSISTANT

## 2024-01-13 PROCEDURE — 99214 OFFICE O/P EST MOD 30 MIN: CPT

## 2024-01-13 PROCEDURE — 99213 OFFICE O/P EST LOW 20 MIN: CPT

## 2024-01-13 RX ORDER — IBUPROFEN 600 MG/1
600 TABLET ORAL EVERY 8 HOURS PRN
Qty: 30 TABLET | Refills: 0 | Status: SHIPPED | OUTPATIENT
Start: 2024-01-13 | End: 2024-01-20

## 2024-01-13 NOTE — ED PROVIDER NOTES
Patient Seen in: [facility]      History     Chief Complaint   Patient presents with    Leg or Foot Injury     Entered by patient     Stated Complaint: Leg or Foot Injury    Subjective:   HPI    43-year-old male who comes in today complaining of left knee pain that started last day after a hockey injury.  Patient states that he immediately hyperextended the left knee and now has pain over the MCL.  Patient denies any previous injury or trauma.  Patient states its painful to walk.    Objective:   Past Medical History:   Diagnosis Date    Esophageal reflux     Heartburn               Past Surgical History:   Procedure Laterality Date    BACK SURGERY  March 2011    L5-S1 microdiscectomy    EGD  2011                Social History     Socioeconomic History    Marital status:    Tobacco Use    Smoking status: Former    Smokeless tobacco: Never   Vaping Use    Vaping Use: Never used   Substance and Sexual Activity    Alcohol use: Yes     Alcohol/week: 6.0 standard drinks of alcohol     Types: 6 Cans of beer per week     Comment: 5x weekly    Drug use: No   Other Topics Concern    Caffeine Concern No     Comment: 2 cups of coffee a day    Exercise Yes     Comment: 3xweek    Seat Belt Yes              Review of Systems    Positive for stated complaint: Leg or Foot Injury  Other systems are as noted in HPI.  Constitutional and vital signs reviewed.      All other systems reviewed and negative except as noted above.    Physical Exam     ED Triage Vitals [01/13/24 0818]   /72   Pulse 65   Resp 15   Temp 97.8 °F (36.6 °C)   Temp src Temporal   SpO2 100 %   O2 Device None (Room air)       Current:/72   Pulse 65   Temp 97.8 °F (36.6 °C) (Temporal)   Resp 15   Ht 182.9 cm (6')   Wt 83.9 kg   SpO2 100%   BMI 25.09 kg/m²         Physical Exam  Vitals and nursing note reviewed.   Constitutional:       General: He is not in acute distress.     Appearance: Normal appearance. He is  well-developed. He is not diaphoretic.   HENT:      Head: Normocephalic and atraumatic.   Cardiovascular:      Rate and Rhythm: Normal rate and regular rhythm.   Pulmonary:      Effort: Pulmonary effort is normal. No respiratory distress.      Breath sounds: Normal breath sounds.   Musculoskeletal:      Cervical back: Normal range of motion.      Right upper leg: Normal.      Left upper leg: Normal.      Right knee: Normal.      Left knee: Decreased range of motion. Tenderness present over the medial joint line and MCL. MCL laxity present.      Left lower leg: Normal.      Left ankle: Normal.   Skin:     General: Skin is warm and dry.      Capillary Refill: Capillary refill takes less than 2 seconds.      Coloration: Skin is not pale.      Findings: No erythema or rash.   Neurological:      Mental Status: He is alert and oriented to person, place, and time.      Motor: No abnormal muscle tone.      Coordination: Coordination normal.      Deep Tendon Reflexes: Reflexes are normal and symmetric.   Psychiatric:         Behavior: Behavior normal.         Thought Content: Thought content normal.         Judgment: Judgment normal.             ED Course   Labs Reviewed - No data to display    XR KNEE (3 VIEWS), LEFT (CPT=73562)    Result Date: 1/13/2024  PROCEDURE:  XR KNEE ROUTINE (3 VIEWS), LEFT (CPT=73562)  TECHNIQUE:  Three views were obtained including patellar view.  COMPARISON:  None.  INDICATIONS:  Status post twisting injury of the left knee while playing hockey yesterday evening.  Patient presents with medial sided left knee pain.  PATIENT STATED HISTORY: (As transcribed by Technologist)  Patient twisted left knee while playing hockey last night. medial left knee pain. shielded     FINDINGS:  BONES:  Normal.  No significant arthropathy or acute abnormality. SOFT TISSUES:  Negative.  No visible soft tissue swelling. EFFUSION:  None visible. OTHER:  Negative.   a          CONCLUSION:  Negative exam.   LOCATION:   [facility]   Dictated by (CST): Anamaria Curran DO on 1/13/2024 at 8:57 AM     Finalized by (CST): Anamaria Curran DO on 1/13/2024 at 8:58 AM                 [facility]               Medical Decision Making  43-year-old male who comes in today complaining of left knee pain that started last day after a hockey injury.  Patient states that he immediately hyperextended the left knee and now has pain over the MCL.  Patient denies any previous injury or trauma.  Patient states its painful to walk.    Problems Addressed:  Sprain of medial collateral ligament of left knee, initial encounter: acute illness or injury    Amount and/or Complexity of Data Reviewed  Radiology: ordered and independent interpretation performed. Decision-making details documented in ED Course.     Details: Reviewed patient's x-ray no evidence of acute effusion, or bony injury or fracture    Risk  OTC drugs.  Prescription drug management.  Risk Details: Clinical Impression: Left MCL sprain      The differential diagnosis before testing included MCL sprain, knee strain, acute fracture, which is a medical condition that poses a threat to life/function.             Disposition and Plan     Clinical Impression:  1. Sprain of medial collateral ligament of left knee, initial encounter         Disposition:  Discharge  1/13/2024  9:00 am    Follow-up:  Oneyda Figueroa PA  [address] [place] [id number]  [phone]    Schedule an appointment as soon as possible for a visit in 2 days            Medications Prescribed:  Discharge Medication List as of 1/13/2024  9:01 AM        START taking these medications    Details   ibuprofen 600 MG Oral Tab Take 1 tablet (600 mg total) by mouth every 8 (eight) hours as needed for Pain or Fever., Normal, Disp-30 tablet, R-0           I have given the patient instructions regarding his diagnosis, expectations, follow up, and return to the ER precautions.  I explained to the patient that emergent conditions may arise to  return to the immediate care or ER for new, worsening or any persistent conditions.  I've explained the importance of following up with his doctor- Tj Hyatt MD  - as instructed.  The patient verbalized understanding of the discharge instructions and plan.

## 2024-01-13 NOTE — DISCHARGE INSTRUCTIONS
Ibuprofen 600mg 3 times a day with food for 3 to 5 days, may alternate with Tylenol 500mg  Light activity, warm cool compresses topically for discomfort  Return if worse      The best treatment for minor injuries is R.I.C.E. and NSAID medications.      R.I.C.E. = Rest  Ice  Compression  Elevation      Rest: Do not use the injured body part unnecessarily.  If this is a lower extremity, do not take long walks, run or do anything that causes increased pain.  Gradually progress to your normal activity, using pain as your guide.       Ice: Apply cold compresses to the injured area. The area that is injured is inflammed. Inflammation causes warmth, so ice may give some relief.  You may ice through a brace or ace wrap.      Compression: Compression to the area will help control swelling. An ace wrap is the most simple form of compression. You can also wear a brace.      Elevation: Raise the injured extremity above heart level. This will reduce throbbing pain and swelling associated with injures.  Prop the injured extremity up with pillows while lying down.

## 2024-01-17 ENCOUNTER — HOSPITAL ENCOUNTER (OUTPATIENT)
Age: 44
Discharge: HOME | End: 2024-01-17
Attending: EMERGENCY MEDICINE
Payer: COMMERCIAL

## 2024-01-17 ENCOUNTER — APPOINTMENT (OUTPATIENT)
Dept: GENERAL RADIOLOGY | Age: 44
End: 2024-01-17
Attending: EMERGENCY MEDICINE
Payer: COMMERCIAL

## 2024-01-17 VITALS
HEART RATE: 64 BPM | SYSTOLIC BLOOD PRESSURE: 105 MMHG | BODY MASS INDEX: 25.06 KG/M2 | RESPIRATION RATE: 20 BRPM | DIASTOLIC BLOOD PRESSURE: 63 MMHG | HEIGHT: 72 IN | WEIGHT: 185 LBS | TEMPERATURE: 100 F | OXYGEN SATURATION: 96 %

## 2024-01-17 DIAGNOSIS — J18.9 COMMUNITY ACQUIRED PNEUMONIA OF LEFT LOWER LOBE OF LUNG: Primary | ICD-10-CM

## 2024-01-17 LAB
POCT INFLUENZA A: NEGATIVE
POCT INFLUENZA B: NEGATIVE
SARS-COV-2 RNA RESP QL NAA+PROBE: NOT DETECTED

## 2024-01-17 PROCEDURE — 87502 INFLUENZA DNA AMP PROBE: CPT | Performed by: EMERGENCY MEDICINE

## 2024-01-17 PROCEDURE — 71046 X-RAY EXAM CHEST 2 VIEWS: CPT | Performed by: EMERGENCY MEDICINE

## 2024-01-17 PROCEDURE — 99215 OFFICE O/P EST HI 40 MIN: CPT

## 2024-01-17 PROCEDURE — 99214 OFFICE O/P EST MOD 30 MIN: CPT

## 2024-01-17 RX ORDER — AMOXICILLIN AND CLAVULANATE POTASSIUM 875; 125 MG/1; MG/1
1 TABLET, FILM COATED ORAL 2 TIMES DAILY
Qty: 20 TABLET | Refills: 0 | Status: SHIPPED | OUTPATIENT
Start: 2024-01-17 | End: 2024-01-27

## 2024-01-17 RX ORDER — BENZONATATE 100 MG/1
100 CAPSULE ORAL 3 TIMES DAILY PRN
Qty: 30 CAPSULE | Refills: 0 | Status: SHIPPED | OUTPATIENT
Start: 2024-01-17 | End: 2024-02-16

## 2024-01-17 NOTE — ED PROVIDER NOTES
Patient Seen in: [facility]      History     Chief Complaint   Patient presents with    Fever    Cough     Stated Complaint: Cough head ache fever sob    Subjective:   HPI    Cough for one week. Last night temp of 102.7 and chills.     Objective:   Past Medical History:   Diagnosis Date    Esophageal reflux     Heartburn               Past Surgical History:   Procedure Laterality Date    BACK SURGERY  March 2011    L5-S1 microdiscectomy    EGD  2011                Social History     Socioeconomic History    Marital status:    Tobacco Use    Smoking status: Former    Smokeless tobacco: Never   Vaping Use    Vaping Use: Never used   Substance and Sexual Activity    Alcohol use: Yes     Alcohol/week: 6.0 standard drinks of alcohol     Types: 6 Cans of beer per week     Comment: 5x weekly    Drug use: No   Other Topics Concern    Caffeine Concern No     Comment: 2 cups of coffee a day    Exercise Yes     Comment: 3xweek    Seat Belt Yes              Review of Systems    Positive for stated complaint: Cough head ache fever sob  Other systems are as noted in HPI.  Constitutional and vital signs reviewed.      All other systems reviewed and negative except as noted above.    Physical Exam     ED Triage Vitals   BP 01/17/24 0851 105/63   Pulse 01/17/24 0851 64   Resp 01/17/24 0851 20   Temp 01/17/24 0851 99.6 °F (37.6 °C)   Temp src --    SpO2 01/17/24 0851 96 %   O2 Device 01/17/24 0849 None (Room air)       Current:/63   Pulse 64   Temp 99.6 °F (37.6 °C)   Resp 20   Ht 182.9 cm (6')   Wt 83.9 kg   SpO2 96%   BMI 25.09 kg/m²         Physical Exam  Vitals and nursing note reviewed.   Constitutional:       Appearance: Normal appearance. He is well-developed.   HENT:      Head: Normocephalic and atraumatic.   Cardiovascular:      Rate and Rhythm: Normal rate and regular rhythm.   Pulmonary:      Effort: Pulmonary effort is normal. No respiratory distress.      Breath sounds: Rhonchi (left  base rhonchi) present.   Musculoskeletal:      Cervical back: Normal range of motion and neck supple.   Skin:     General: Skin is warm and dry.      Capillary Refill: Capillary refill takes less than 2 seconds.   Neurological:      General: No focal deficit present.      Mental Status: He is alert.      Sensory: No sensory deficit.   Psychiatric:         Mood and Affect: Mood normal.         Behavior: Behavior normal.              ED Course     Labs Reviewed   POCT FLU TEST - Normal    Narrative:     This assay is a rapid molecular in vitro test utilizing nucleic acid amplification of influenza A and B viral RNA.   RAPID SARS-COV-2 BY PCR - Normal                      MDM                                      Medical Decision Making    Viral vs bacterial respiratory infection. COVID and flu both negative. CXR images reviewed by myself. Left lower lobe pneumonia noted. Discharge on augmentin. And tessalon for cough.   Disposition and Plan     Clinical Impression:  1. Community acquired pneumonia of left lower lobe of lung         Disposition:  Discharge  1/17/2024 10:57 am    Follow-up:  Tj Hyatt MD  [address]  [facility] [address]  [phone]      As needed          Medications Prescribed:  Current Discharge Medication List        START taking these medications    Details   amoxicillin clavulanate 875-125 MG Oral Tab Take 1 tablet by mouth 2 (two) times daily for 10 days.  Qty: 20 tablet, Refills: 0      benzonatate 100 MG Oral Cap Take 1 capsule (100 mg total) by mouth 3 (three) times daily as needed for cough.  Qty: 30 capsule, Refills: 0

## 2024-04-26 ENCOUNTER — HOSPITAL ENCOUNTER (OUTPATIENT)
Age: 44
Discharge: HOME | End: 2024-04-26
Payer: COMMERCIAL

## 2024-04-26 VITALS
TEMPERATURE: 97 F | HEART RATE: 55 BPM | WEIGHT: 185 LBS | SYSTOLIC BLOOD PRESSURE: 133 MMHG | HEIGHT: 72 IN | RESPIRATION RATE: 18 BRPM | DIASTOLIC BLOOD PRESSURE: 93 MMHG | OXYGEN SATURATION: 100 % | BODY MASS INDEX: 25.06 KG/M2

## 2024-04-26 DIAGNOSIS — H01.005 BLEPHARITIS OF LEFT LOWER EYELID, UNSPECIFIED TYPE: Primary | ICD-10-CM

## 2024-04-26 DIAGNOSIS — H10.32 ACUTE BACTERIAL CONJUNCTIVITIS OF LEFT EYE: ICD-10-CM

## 2024-04-26 PROCEDURE — 99213 OFFICE O/P EST LOW 20 MIN: CPT | Performed by: NURSE PRACTITIONER

## 2024-04-26 RX ORDER — OFLOXACIN 3 MG/ML
1 SOLUTION/ DROPS OPHTHALMIC 4 TIMES DAILY
Qty: 1 EACH | Refills: 0 | Status: SHIPPED | OUTPATIENT
Start: 2024-04-26 | End: 2024-05-03

## 2024-04-26 NOTE — ED PROVIDER NOTES
Patient Seen in: [facility]      History     Chief Complaint   Patient presents with    Eye Visual Problem     Stated Complaint: left eye redness/ x1 day    Subjective:   HPI    44 year old male presents today with c/o left eye redness x 1 day. Pt states he was at a smoked BBQ place and his eye got irritated. Pt denies any visual disturbances with the eye irritiontion. 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     Objective:   Past Medical History:    Esophageal reflux    Heartburn              Past Surgical History:   Procedure Laterality Date    Back surgery  March 2011    L5-S1 microdiscectomy    Egd  2011                Social History     Socioeconomic History    Marital status:    Tobacco Use    Smoking status: Former    Smokeless tobacco: Never   Vaping Use    Vaping status: Never Used   Substance and Sexual Activity    Alcohol use: Yes     Alcohol/week: 6.0 standard drinks of alcohol     Types: 6 Cans of beer per week     Comment: 5x weekly    Drug use: No   Other Topics Concern    Caffeine Concern No     Comment: 2 cups of coffee a day    Exercise Yes     Comment: 3xweek    Seat Belt Yes              Review of Systems   Constitutional: Negative.    HENT: Negative.     Eyes:  Positive for discharge and redness.   Respiratory: Negative.     Cardiovascular: Negative.    Gastrointestinal: Negative.    Endocrine: Negative.    Genitourinary: Negative.    Musculoskeletal: Negative.    Skin: Negative.    Allergic/Immunologic: Negative.    Neurological: Negative.    Hematological: Negative.    Psychiatric/Behavioral: Negative.         Positive for stated complaint: left eye redness/ x1 day  Other systems are as noted in HPI.  Constitutional and vital signs reviewed.      All other systems reviewed and negative except as noted above.    Physical Exam     ED Triage Vitals [04/26/24 1246]   BP (!) 133/93   Pulse 55   Resp 18   Temp 97.3 °F (36.3 °C)   Temp src Temporal   SpO2  100 %   O2 Device None (Room air)       Current:BP (!) 133/93   Pulse 55   Temp 97.3 °F (36.3 °C) (Temporal)   Resp 18   Ht 182.9 cm (6')   Wt 83.9 kg   SpO2 100%   BMI 25.09 kg/m²     Right Eye Chart Acuity: 20/70, Corrected  Left Eye Chart Acuity: 20/50, Corrected    Physical Exam  Vitals and nursing note reviewed.   Constitutional:       Appearance: Normal appearance. He is normal weight.   HENT:      Head: Normocephalic.      Right Ear: External ear normal.      Left Ear: External ear normal.      Nose: Nose normal.      Mouth/Throat:      Mouth: Mucous membranes are moist.      Pharynx: Oropharynx is clear.   Eyes:      General:         Left eye: Discharge present.     Extraocular Movements: Extraocular movements intact.      Pupils: Pupils are equal, round, and reactive to light.      Comments: Left conjunctival mild redness and blepharitis noted. .    Pulmonary:      Effort: Pulmonary effort is normal.   Musculoskeletal:      Cervical back: Normal range of motion and neck supple.   Skin:     General: Skin is warm.   Neurological:      Mental Status: He is alert.   Psychiatric:         Mood and Affect: Mood normal.             ED Course   Labs Reviewed - No data to display                   MDM      44 year old male presents today with c/o left eye redness x 1 day. Pt states he was at a smoked BBQ place and his eye got irritated. Pt denies any visual disturbances with the eye irritiontion. 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   VS: See EMR  Physical exam: See exam  Diff Diag: blepharitis, conjunctivitis.   Will diag with blepharitis, conjunctivitis and treat with ocuflox. Pt to follow up with PCP in 2-3 days. ED precautions given.   Note to Patient  The 21st Century Cures Act makes medical notes like these available to patients in the interest of transparency. However, be advised this is a medical document and is intended as tady-td-hwof communication; it is written in medical  language and may appear blunt, direct, or contain abbreviations or verbiage that are unfamiliar. Medical documents are intended to carry relevant information, facts as evident, and the clinical opinion of the practitioner.     This report has been produced using speech recognition software, and may contain errors related to grammar, punctuation, spelling, words or phrases unrecognized or not translated appropriately to text; these errors may be referred to the dictating provider for further clarification and/or addendum as needed.                                     Medical Decision Making  44 year old male presents today with c/o left eye redness x 1 day. Pt states he was at a smoked BBQ place and his eye got irritated. Pt denies any visual disturbances with the eye irritiontion. 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     Problems Addressed:  Acute bacterial conjunctivitis of left eye: acute illness or injury  Blepharitis of left lower eyelid, unspecified type: acute illness or injury    Risk  Prescription drug management.        Disposition and Plan     Clinical Impression:  1. Blepharitis of left lower eyelid, unspecified type    2. Acute bacterial conjunctivitis of left eye         Disposition:  Discharge  4/26/2024 12:57 pm    Follow-up:  Tj Hyatt MD  [address]  [facility] [address]  [phone]    In 1 week            Medications Prescribed:  Discharge Medication List as of 4/26/2024 12:58 PM        START taking these medications    Details   ofloxacin 0.3 % Ophthalmic Solution Place 1 drop into the left eye 4 (four) times daily for 7 days., Normal, Disp-1 each, R-0

## 2024-07-09 ENCOUNTER — PATIENT MESSAGE (OUTPATIENT)
Dept: FAMILY MEDICINE CLINIC | Facility: CLINIC | Age: 44
End: 2024-07-09

## 2024-07-09 DIAGNOSIS — Z00.00 LABORATORY EXAM ORDERED AS PART OF ROUTINE GENERAL MEDICAL EXAMINATION: Primary | ICD-10-CM

## 2024-07-09 NOTE — TELEPHONE ENCOUNTER
From: Sherman Hawkins  To: Tj Hyatt  Sent: 7/9/2024 1:27 PM CDT  Subject: Blood panel order    Greetings -    Would like to get annual blood tests taken and reviewed at my annual physical on the 24th. Can you please place an order for me so I can get it done in advance? Thanks

## 2024-07-09 NOTE — TELEPHONE ENCOUNTER
Future Appointments   Date Time Provider Department Center   7/24/2024  7:30 AM Tj Hyatt MD [facility]      Please order labs for upcoming physical.    Routed to Dr Hyatt

## 2024-07-10 ENCOUNTER — LAB ENCOUNTER (OUTPATIENT)
Dept: LAB | Facility: HOSPITAL | Age: 44
End: 2024-07-10
Attending: FAMILY MEDICINE
Payer: COMMERCIAL

## 2024-07-10 DIAGNOSIS — Z00.00 LABORATORY EXAM ORDERED AS PART OF ROUTINE GENERAL MEDICAL EXAMINATION: ICD-10-CM

## 2024-07-10 LAB
ALBUMIN SERPL-MCNC: 3.9 G/DL (ref 3.4–5)
ALBUMIN/GLOB SERPL: 1.1 {RATIO} (ref 1–2)
ALP LIVER SERPL-CCNC: 58 U/L
ALT SERPL-CCNC: 41 U/L
ANION GAP SERPL CALC-SCNC: 9 MMOL/L (ref 0–18)
AST SERPL-CCNC: 25 U/L (ref 15–37)
BASOPHILS # BLD AUTO: 0.03 X10(3) UL (ref 0–0.2)
BASOPHILS NFR BLD AUTO: 0.5 %
BILIRUB SERPL-MCNC: 0.8 MG/DL (ref 0.1–2)
BUN BLD-MCNC: 15 MG/DL (ref 9–23)
CALCIUM BLD-MCNC: 9.5 MG/DL (ref 8.5–10.1)
CHLORIDE SERPL-SCNC: 107 MMOL/L (ref 98–112)
CHOLEST SERPL-MCNC: 254 MG/DL (ref ?–200)
CO2 SERPL-SCNC: 25 MMOL/L (ref 21–32)
CREAT BLD-MCNC: 0.92 MG/DL
EGFRCR SERPLBLD CKD-EPI 2021: 107 ML/MIN/1.73M2 (ref 60–?)
EOSINOPHIL # BLD AUTO: 0.08 X10(3) UL (ref 0–0.7)
EOSINOPHIL NFR BLD AUTO: 1.5 %
ERYTHROCYTE [DISTWIDTH] IN BLOOD BY AUTOMATED COUNT: 12 %
EST. AVERAGE GLUCOSE BLD GHB EST-MCNC: 91 MG/DL (ref 68–126)
FASTING PATIENT LIPID ANSWER: YES
FASTING STATUS PATIENT QL REPORTED: YES
GLOBULIN PLAS-MCNC: 3.4 G/DL (ref 2.8–4.4)
GLUCOSE BLD-MCNC: 85 MG/DL (ref 70–99)
HBA1C MFR BLD: 4.8 % (ref ?–5.7)
HCT VFR BLD AUTO: 45.3 %
HDLC SERPL-MCNC: 55 MG/DL (ref 40–59)
HGB BLD-MCNC: 15.9 G/DL
IMM GRANULOCYTES # BLD AUTO: 0.02 X10(3) UL (ref 0–1)
IMM GRANULOCYTES NFR BLD: 0.4 %
LDLC SERPL CALC-MCNC: 184 MG/DL (ref ?–100)
LYMPHOCYTES # BLD AUTO: 1.35 X10(3) UL (ref 1–4)
LYMPHOCYTES NFR BLD AUTO: 24.5 %
MCH RBC QN AUTO: 31.6 PG (ref 26–34)
MCHC RBC AUTO-ENTMCNC: 35.1 G/DL (ref 31–37)
MCV RBC AUTO: 90.1 FL
MONOCYTES # BLD AUTO: 0.43 X10(3) UL (ref 0.1–1)
MONOCYTES NFR BLD AUTO: 7.8 %
NEUTROPHILS # BLD AUTO: 3.6 X10 (3) UL (ref 1.5–7.7)
NEUTROPHILS # BLD AUTO: 3.6 X10(3) UL (ref 1.5–7.7)
NEUTROPHILS NFR BLD AUTO: 65.3 %
NONHDLC SERPL-MCNC: 199 MG/DL (ref ?–130)
OSMOLALITY SERPL CALC.SUM OF ELEC: 292 MOSM/KG (ref 275–295)
PLATELET # BLD AUTO: 216 10(3)UL (ref 150–450)
POTASSIUM SERPL-SCNC: 4 MMOL/L (ref 3.5–5.1)
PROT SERPL-MCNC: 7.3 G/DL (ref 6.4–8.2)
RBC # BLD AUTO: 5.03 X10(6)UL
SODIUM SERPL-SCNC: 141 MMOL/L (ref 136–145)
TRIGL SERPL-MCNC: 86 MG/DL (ref 30–149)
TSI SER-ACNC: 2.13 MIU/ML (ref 0.36–3.74)
VLDLC SERPL CALC-MCNC: 18 MG/DL (ref 0–30)
WBC # BLD AUTO: 5.5 X10(3) UL (ref 4–11)

## 2024-07-10 PROCEDURE — 80061 LIPID PANEL: CPT

## 2024-07-10 PROCEDURE — 36415 COLL VENOUS BLD VENIPUNCTURE: CPT

## 2024-07-10 PROCEDURE — 84443 ASSAY THYROID STIM HORMONE: CPT

## 2024-07-10 PROCEDURE — 80053 COMPREHEN METABOLIC PANEL: CPT

## 2024-07-10 PROCEDURE — 85025 COMPLETE CBC W/AUTO DIFF WBC: CPT

## 2024-07-10 PROCEDURE — 83036 HEMOGLOBIN GLYCOSYLATED A1C: CPT

## 2024-07-24 ENCOUNTER — OFFICE VISIT (OUTPATIENT)
Dept: FAMILY MEDICINE CLINIC | Facility: CLINIC | Age: 44
End: 2024-07-24
Payer: COMMERCIAL

## 2024-07-24 VITALS
WEIGHT: 184.5 LBS | SYSTOLIC BLOOD PRESSURE: 100 MMHG | OXYGEN SATURATION: 98 % | HEART RATE: 68 BPM | DIASTOLIC BLOOD PRESSURE: 60 MMHG | BODY MASS INDEX: 24.99 KG/M2 | HEIGHT: 72 IN | RESPIRATION RATE: 16 BRPM

## 2024-07-24 DIAGNOSIS — Z00.00 ANNUAL PHYSICAL EXAM: Primary | ICD-10-CM

## 2024-07-24 DIAGNOSIS — E78.00 HYPERCHOLESTEREMIA: ICD-10-CM

## 2024-07-24 PROCEDURE — 3078F DIAST BP <80 MM HG: CPT | Performed by: FAMILY MEDICINE

## 2024-07-24 PROCEDURE — 99396 PREV VISIT EST AGE 40-64: CPT | Performed by: FAMILY MEDICINE

## 2024-07-24 PROCEDURE — 3008F BODY MASS INDEX DOCD: CPT | Performed by: FAMILY MEDICINE

## 2024-07-24 PROCEDURE — 3074F SYST BP LT 130 MM HG: CPT | Performed by: FAMILY MEDICINE

## 2024-07-24 NOTE — PROGRESS NOTES
Chief Complaint   Patient presents with    Well Adult     Patient here for physical      HPI:   Sherman Hawkins is a 44 year old male who presents for a complete physical exam.     Last colonoscopy:  N/a - screening at 46yo   Last PSA:  N/a - screening at 52   Immunizations: TDaP 2018.      HLD - jump in the cholesterol levels.  Has worked on diet some.      Wt Readings from Last 6 Encounters:   07/24/24 184 lb 8 oz (83.7 kg)   04/26/24 185 lb (83.9 kg)   01/17/24 185 lb (83.9 kg)   01/13/24 185 lb (83.9 kg)   01/10/24 185 lb (83.9 kg)   07/31/23 185 lb 2 oz (84 kg)     Body mass index is 25.02 kg/m².     Chemistry Labs:   Lab Results   Component Value Date/Time    GLU 85 07/10/2024 07:25 AM     07/10/2024 07:25 AM    K 4.0 07/10/2024 07:25 AM     07/10/2024 07:25 AM    CO2 25.0 07/10/2024 07:25 AM    CREATSERUM 0.92 07/10/2024 07:25 AM    CA 9.5 07/10/2024 07:25 AM    ALB 3.9 07/10/2024 07:25 AM    TP 7.3 07/10/2024 07:25 AM    ALKPHO 58 07/10/2024 07:25 AM    AST 25 07/10/2024 07:25 AM    ALT 41 07/10/2024 07:25 AM    BILT 0.8 07/10/2024 07:25 AM          Cholesterol  (most recent labs)   Lab Results   Component Value Date/Time    CHOLEST 254 (H) 07/10/2024 07:25 AM    HDL 55 07/10/2024 07:25 AM     (H) 07/10/2024 07:25 AM    TRIG 86 07/10/2024 07:25 AM      No results found for: \"PSA\"      Current Outpatient Medications   Medication Sig Dispense Refill    OMEPRAZOLE 20 MG Oral Capsule Delayed Release TAKE 1 CAPSULE(20 MG) BY MOUTH BEFORE BREAKFAST 90 capsule 3      Past Medical History:    Esophageal reflux    Heartburn      Past Surgical History:   Procedure Laterality Date    Back surgery  March 2011    L5-S1 microdiscectomy    Egd  2011      Family History   Problem Relation Age of Onset    Cancer Father         lymphoma    Prostate Cancer Father     Alcohol abuse Sister     Bipolar Disorder Sister     Heart Disorder Maternal Grandfather     Colon Cancer Maternal Grandfather       Social  History:  Social History     Socioeconomic History    Marital status:    Tobacco Use    Smoking status: Former    Smokeless tobacco: Never   Vaping Use    Vaping status: Never Used   Substance and Sexual Activity    Alcohol use: Yes     Alcohol/week: 6.0 standard drinks of alcohol     Types: 6 Cans of beer per week     Comment: 5x weekly    Drug use: No   Other Topics Concern    Caffeine Concern No     Comment: 2 cups of coffee a day    Exercise Yes     Comment: 4-5 xweek    Seat Belt Yes      Occ: real estate.   : yes. Children: 3 boys - 12, 9, 5.   Exercise: active.    Diet: working on improving this.       REVIEW OF SYSTEMS:     All systems reviewed, negative other than noted above.    EXAM:   /60   Pulse 68   Resp 16   Ht 6' (1.829 m)   Wt 184 lb 8 oz (83.7 kg)   SpO2 98%   BMI 25.02 kg/m²   Body mass index is 25.02 kg/m².     General appearance: alert, appears stated age and cooperative  Eyes: conjunctivae/corneas clear. PERRL, EOM's intact.   Ears: normal TM's and external ear canals both ears  Neck: no adenopathy, no JVD, supple, symmetrical, trachea midline and thyroid not enlarged, symmetric, no tenderness/mass/nodules  Lungs: clear to auscultation bilaterally  Heart: S1, S2 normal, no murmur, click, rub or gallop, regular rate and rhythm  Abdomen: soft, non-tender; bowel sounds normal; no masses,  no organomegaly  Extremities: extremities normal, atraumatic, no cyanosis or edema  Pulses: 2+ and symmetric  Neurologic: Alert and oriented X 3, normal strength and tone. Normal symmetric reflexes. Normal coordination and gait     ASSESSMENT AND PLAN:     Sherman Hawkins was seen in the office today:  had concerns including Well Adult (Patient here for physical).    1. Annual physical exam  Overall well  Managing stress, work well  C-scope at 47  Labs normal outside of the HLD    2. Hypercholesteremia  An increase  Reviewed diet, lifestyle.  May be contributing some.  Now more active,  watching diet  Will recheck in a year.       Tj Hyatt M.D.   [facility]  07/24/24

## 2024-07-28 ENCOUNTER — ORDER TRANSCRIPTION (OUTPATIENT)
Dept: ADMINISTRATIVE | Facility: HOSPITAL | Age: 44
End: 2024-07-28

## 2024-07-28 DIAGNOSIS — Z13.6 SCREENING FOR CARDIOVASCULAR CONDITION: Primary | ICD-10-CM

## 2024-08-01 ENCOUNTER — HOSPITAL ENCOUNTER (OUTPATIENT)
Dept: CT IMAGING | Facility: HOSPITAL | Age: 44
Discharge: HOME | End: 2024-08-01
Attending: FAMILY MEDICINE

## 2024-08-01 VITALS
DIASTOLIC BLOOD PRESSURE: 72 MMHG | HEIGHT: 72 IN | BODY MASS INDEX: 24.92 KG/M2 | WEIGHT: 184 LBS | SYSTOLIC BLOOD PRESSURE: 122 MMHG

## 2024-08-01 DIAGNOSIS — Z13.6 SCREENING FOR CARDIOVASCULAR CONDITION: ICD-10-CM

## 2024-08-01 NOTE — PROGRESS NOTES
Date of Service 8/1/2024    ASYA DURÁN  Date of Birth 3/21/1980    Patient Age: 44 year old    PCP: Tj Hyatt MD  [address]  [facility] [id number]    Heart Scan Consult  Preliminary Heart Scan Score: 0    Previous Screening  Heart Scan Completed Previously: Yes  Year of last heart scan: 2021  Score of last heart scan: 0  Peripheral Vascular Scan Completed Previously: No          Risk Factors  Family history of high cholesterol   Personal Risk Factors  Non-alterable Risk Factors: Age  Alterable Risk Factors: Abnormal Cholesterol;Stress      Body Mass Index  Body mass index is 24.95 kg/m².    Blood Pressure     /72     (Normal =< 120/80,  Elevated = 120-129/ >80,  High Stage1 130-139/80-89 , Stage2 >140/>90)    Lipid Profile  Cholesterol: 254, done on 7/10/2024.  HDL Cholesterol: 55, done on 7/10/2024.  LDL Cholesterol: 184, done on 7/10/2024.  TriGlycerides 86, done on 7/10/2024.    Cholesterol Goals  Value   Total  =< 200   HDL  = > 45 Men = > 55 Women   LDL   =< 100   Triglycerides  =< 150       Glucose and Hemoglobin A1C  Lab Results   Component Value Date    GLU 85 07/10/2024    A1C 4.8 07/10/2024     (Normal Fasting Glucose < 100mg/dl )    Nurse Review  Risk factor information and results reviewed with Nurse: Yes    Recommended Follow Up:  Consult your physician regarding:: Final Heart Scan Report;Discuss potential for Incidental Finding;Discuss Potential for Score Variance      Recommendations for Change:  Nutrition Changes: Low Saturated Fat;Low Fat Dairy;Increase Fiber    Cholesterol Modification (goal of therapy depends upon your risk): Decrease LDL (Lousy/Bad) Ideal <100    Exercise:  (exercises regularly)         Weight Management: Maintain Current Weight    Stress Management: Adopt Stress Management Techniques    Repeat Heart Scan: 5 years if Calcium Score is 0.0;Discuss with your Physician              [place] Recommended Resources:  Recommended Resources: Upcoming  Classes, Medical Services and Health Library [email or web address]            Mari SALOMON RN        Please Contact the Nurse Heart Line with any Questions or Concerns [phone].

## 2024-08-13 ENCOUNTER — OFFICE VISIT (OUTPATIENT)
Dept: FAMILY MEDICINE CLINIC | Facility: CLINIC | Age: 44
End: 2024-08-13
Payer: COMMERCIAL

## 2024-08-13 VITALS
BODY MASS INDEX: 24.38 KG/M2 | HEIGHT: 72 IN | DIASTOLIC BLOOD PRESSURE: 78 MMHG | HEART RATE: 62 BPM | TEMPERATURE: 97 F | OXYGEN SATURATION: 100 % | RESPIRATION RATE: 18 BRPM | SYSTOLIC BLOOD PRESSURE: 127 MMHG | WEIGHT: 180 LBS

## 2024-08-13 DIAGNOSIS — H60.332 ACUTE SWIMMER'S EAR OF LEFT SIDE: Primary | ICD-10-CM

## 2024-08-13 PROCEDURE — 3078F DIAST BP <80 MM HG: CPT | Performed by: NURSE PRACTITIONER

## 2024-08-13 PROCEDURE — 3008F BODY MASS INDEX DOCD: CPT | Performed by: NURSE PRACTITIONER

## 2024-08-13 PROCEDURE — 99213 OFFICE O/P EST LOW 20 MIN: CPT | Performed by: NURSE PRACTITIONER

## 2024-08-13 PROCEDURE — 3074F SYST BP LT 130 MM HG: CPT | Performed by: NURSE PRACTITIONER

## 2024-08-13 NOTE — PROGRESS NOTES
Subjective:   Patient ID: Sherman Hawkins is a 44 year old male.    Ear Problem   There is pain in the left ear. This is a new problem. Episode onset: in the past three days. The problem occurs constantly. The problem has been gradually worsening. There has been no fever. The pain is mild. Associated symptoms include ear discharge and hearing loss. He has tried ear drops for the symptoms. The treatment provided no relief. ruptured eardrum in past, swimmer's ear       History/Other:   Review of Systems   HENT:  Positive for ear discharge and hearing loss.    All other systems reviewed and are negative.    Current Outpatient Medications   Medication Sig Dispense Refill    OMEPRAZOLE 20 MG Oral Capsule Delayed Release TAKE 1 CAPSULE(20 MG) BY MOUTH BEFORE BREAKFAST 90 capsule 3     Allergies:No Known Allergies    /78   Pulse 62   Temp 97.4 °F (36.3 °C) (Oral)   Resp 18   Ht 6' (1.829 m)   Wt 180 lb (81.6 kg)   SpO2 100%   BMI 24.41 kg/m²       Objective:   Physical Exam  Constitutional:       General: He is not in acute distress.     Appearance: Normal appearance. He is not ill-appearing.   HENT:      Head: Normocephalic and atraumatic.      Right Ear: Tympanic membrane, ear canal and external ear normal. Drainage and swelling present.      Left Ear: Tympanic membrane and external ear normal.      Ears:      Comments: White/yellow exudate noted with swollen canal. TM intact.     Nose: Nose normal.      Mouth/Throat:      Mouth: Mucous membranes are moist.      Pharynx: Oropharynx is clear. No oropharyngeal exudate.   Eyes:      Pupils: Pupils are equal, round, and reactive to light.   Cardiovascular:      Rate and Rhythm: Normal rate and regular rhythm.      Pulses: Normal pulses.   Pulmonary:      Effort: Pulmonary effort is normal.      Breath sounds: Normal breath sounds.   Musculoskeletal:         General: Normal range of motion.      Cervical back: Normal range of motion and neck supple.    Lymphadenopathy:      Cervical: No cervical adenopathy.   Skin:     General: Skin is warm and dry.   Neurological:      General: No focal deficit present.      Mental Status: He is alert.   Psychiatric:         Mood and Affect: Mood normal.         Assessment & Plan:   1. Acute swimmer's ear of left side        No orders of the defined types were placed in this encounter.      Meds This Visit:  Requested Prescriptions     Signed Prescriptions Disp Refills    neomycin-polymyxin-hydrocortisone 3.5-66567-1 Otic Solution 10 mL 0     Sig: Place 3 drops into the left ear 3 (three) times daily for 7 days.

## 2024-08-19 ENCOUNTER — OFFICE VISIT (OUTPATIENT)
Dept: OTOLARYNGOLOGY | Facility: CLINIC | Age: 44
End: 2024-08-19

## 2024-08-19 DIAGNOSIS — H60.332 ACUTE SWIMMER'S EAR OF LEFT SIDE: Primary | ICD-10-CM

## 2024-08-19 PROCEDURE — 92504 EAR MICROSCOPY EXAMINATION: CPT | Performed by: OTOLARYNGOLOGY

## 2024-08-19 PROCEDURE — 99203 OFFICE O/P NEW LOW 30 MIN: CPT | Performed by: OTOLARYNGOLOGY

## 2024-08-19 RX ORDER — CIPROFLOXACIN 500 MG/1
500 TABLET, FILM COATED ORAL EVERY 12 HOURS
Qty: 14 TABLET | Refills: 0 | Status: SHIPPED | OUTPATIENT
Start: 2024-08-19

## 2024-08-19 RX ORDER — TOBRAMYCIN AND DEXAMETHASONE 3; 1 MG/ML; MG/ML
3 SUSPENSION/ DROPS OPHTHALMIC EVERY 8 HOURS
Qty: 10 ML | Refills: 0 | Status: SHIPPED | OUTPATIENT
Start: 2024-08-19

## 2024-08-19 NOTE — PROGRESS NOTES
Sherman Hawkins is a 44 year old male.    Chief Complaint   Patient presents with    Ear Problem     Patient is here due to pain and pressure in left ear. X 1 week. Reports not being able to hear, reports improvement in pain.        HISTORY OF PRESENT ILLNESS  He presents with a history of a previously perforated tympanic membrane years ago that closed on its own.  States that he is always been sensitive to water and most recently was in a situation where he got some water in his ear about 8 days ago.  Had some trouble with drainage pain and decreased hearing.  [facility] neomycin polymyxin eardrops which she has used with some improvement in his pain but no real improvement in his hearing.  He has an athlete and participates in many marathons which she will be doing in the near future.  Typically wears a swim cap that protects his ears and uses an ear plug.  No other signs, symptoms or complaints.      Social History     Socioeconomic History    Marital status:    Tobacco Use    Smoking status: Former    Smokeless tobacco: Never   Vaping Use    Vaping status: Never Used   Substance and Sexual Activity    Alcohol use: Yes     Alcohol/week: 6.0 standard drinks of alcohol     Types: 6 Cans of beer per week     Comment: 5x weekly    Drug use: No   Other Topics Concern    Caffeine Concern No     Comment: 2 cups of coffee a day    Exercise Yes     Comment: 4-5 xweek    Seat Belt Yes       Family History   Problem Relation Age of Onset    Cancer Father         lymphoma    Prostate Cancer Father     Alcohol abuse Sister     Bipolar Disorder Sister     Heart Disorder Maternal Grandfather     Colon Cancer Maternal Grandfather        Past Medical History:    Esophageal reflux    Heartburn       Past Surgical History:   Procedure Laterality Date    Back surgery  March 2011    L5-S1 microdiscectomy    Egd  2011         REVIEW OF SYSTEMS    System Neg/Pos Details   Constitutional Negative  Fatigue, fever and weight loss.   ENMT Negative Drooling.   Eyes Negative Blurred vision and vision changes.   Respiratory Negative Dyspnea and wheezing.   Cardio Negative Chest pain, irregular heartbeat/palpitations and syncope.   GI Negative Abdominal pain and diarrhea.   Endocrine Negative Cold intolerance and heat intolerance.   Neuro Negative Tremors.   Psych Negative Anxiety and depression.   Integumentary Negative Frequent skin infections, pigment change and rash.   Hema/Lymph Negative Easy bleeding and easy bruising.           PHYSICAL EXAM    There were no vitals taken for this visit.       Constitutional Normal Overall appearance - Normal.   Psychiatric Normal Orientation - Oriented to time, place, person & situation. Appropriate mood and affect.   Neck Exam Normal Inspection - Normal. Palpation - Normal. Parotid gland - Normal. Thyroid gland - Normal.   Eyes Normal Conjunctiva - Right: Normal, Left: Normal. Pupil - Right: Normal, Left: Normal. Fundus - Right: Normal, Left: Normal.   Neurological Normal Memory - Normal. Cranial nerves - Cranial nerves II through XII grossly intact.   Head/Face Normal Facial features - Normal. Eyebrows - Normal. Skull - Normal.        Nasopharynx Normal External nose - Normal. Lips/teeth/gums - Normal. Tonsils - Normal. Oropharynx - Normal.   Ears Normal Inspection - Right: Normal, Left: Normal. Canal - Right: Normal, Left: Infected TM - Right: Normal, Left: Normal.  Intact no perforation   Skin Normal Inspection - Normal.        Lymph Detail Normal Submental. Submandibular. Anterior cervical. Posterior cervical. Supraclavicular.        Nose/Mouth/Throat Normal External nose - Normal. Lips/teeth/gums - Normal. Tonsils - Normal. Oropharynx - Normal.   Nose/Mouth/Throat Normal Nares - Right: Normal Left: Normal. Septum -Normal  Turbinates - Right: Normal, Left: Normal.   Microscopy  Binocular microscopy was performed. The affected ear(s) was/were examined and all debris  removed using suction. The findings are described in the physical exam.   Left ear cleaned of all mucopurulent debris using suction microscopy.  Eroded medial canal skin circumferentially.    Current Outpatient Medications:     ciprofloxacin 500 MG Oral Tab, Take 1 tablet (500 mg total) by mouth every 12 (twelve) hours., Disp: 14 tablet, Rfl: 0    tobramycin-dexamethasone 0.3-0.1 % Ophthalmic Suspension, Place 3 drops in ear(s) every 8 (eight) hours., Disp: 10 mL, Rfl: 0    neomycin-polymyxin-hydrocortisone 3.5-11239-9 Otic Solution, Place 3 drops into the left ear 3 (three) times daily for 7 days., Disp: 10 mL, Rfl: 0    OMEPRAZOLE 20 MG Oral Capsule Delayed Release, TAKE 1 CAPSULE(20 MG) BY MOUTH BEFORE BREAKFAST, Disp: 90 capsule, Rfl: 3  ASSESSMENT AND PLAN    1. Acute swimmer's ear of left side  Ear cleaned of all debris using suction microscopy.  Very eroded canal having some preauricular pain suggestive of cellulitis.  Start ciprofloxacin twice daily as well as TobraDex eardrops return to see me in 2 weeks for reevaluation.  Strict water precautions and use of a swim cap and ear protection during his marathons recommended  - EAR MICROSCOPY EXAMINATION        This note was prepared using Dragon [employer] voice recognition dictation software. As a result errors may occur. When identified these errors have been corrected. While every attempt is made to correct errors during dictation discrepancies may still exist    Shoaib Roldan MD    8/19/2024    6:38 PM

## 2024-09-03 ENCOUNTER — OFFICE VISIT (OUTPATIENT)
Dept: OTOLARYNGOLOGY | Facility: CLINIC | Age: 44
End: 2024-09-03
Payer: COMMERCIAL

## 2024-09-03 VITALS
TEMPERATURE: 97 F | HEART RATE: 78 BPM | BODY MASS INDEX: 24.52 KG/M2 | WEIGHT: 185 LBS | RESPIRATION RATE: 20 BRPM | HEIGHT: 73 IN | OXYGEN SATURATION: 100 %

## 2024-09-03 DIAGNOSIS — H72.92 PERFORATION OF LEFT TYMPANIC MEMBRANE: Primary | ICD-10-CM

## 2024-09-03 NOTE — PROGRESS NOTES
Sherman Hawkins is a 44 year old male.    Chief Complaint   Patient presents with    Follow - Up     Follow up left side ringing in ear and swimmer;s clear , patient reports still has ringing in left side ear no other concerns       HISTORY OF PRESENT ILLNESS  He presents with a history of a previously perforated tympanic membrane years ago that closed on its own.  States that he is always been sensitive to water and most recently was in a situation where he got some water in his ear about 8 days ago.  Had some trouble with drainage pain and decreased hearing.  [facility] was prescribed neomycin polymyxin eardrops which she has used with some improvement in his pain but no real improvement in his hearing.  He has an athlete and participates in many marathons which she will be doing in the near future.  Typically wears a swim cap that protects his ears and uses an ear plug.  No other signs, symptoms or complaints.     9/3/24 ear pain facial pain has resolved on antibiotics and drops.  Ear feels a little bit muffled at times but otherwise unremarkable.  He will no longer be participating in his mini marathon.  Otherwise.  Here to discuss further management.  Previously noted to have otitis externa with swelling and erosion of the lateral canal and what appeared to be cellulitis of the facial tissues      Social History     Socioeconomic History    Marital status:    Tobacco Use    Smoking status: Former    Smokeless tobacco: Never   Vaping Use    Vaping status: Never Used   Substance and Sexual Activity    Alcohol use: Yes     Alcohol/week: 6.0 standard drinks of alcohol     Types: 6 Cans of beer per week     Comment: 5x weekly    Drug use: No   Other Topics Concern    Caffeine Concern No     Comment: 2 cups of coffee a day    Exercise Yes     Comment: 4-5 xweek    Seat Belt Yes       Family History   Problem Relation Age of Onset    Cancer Father         lymphoma    Prostate Cancer Father      Alcohol abuse Sister     Bipolar Disorder Sister     Heart Disorder Maternal Grandfather     Colon Cancer Maternal Grandfather        Past Medical History:    Esophageal reflux    Heartburn       Past Surgical History:   Procedure Laterality Date    Back surgery  March 2011    L5-S1 microdiscectomy    Egd  2011         REVIEW OF SYSTEMS    System Neg/Pos Details   Constitutional Negative Fatigue, fever and weight loss.   ENMT Negative Drooling.   Eyes Negative Blurred vision and vision changes.   Respiratory Negative Dyspnea and wheezing.   Cardio Negative Chest pain, irregular heartbeat/palpitations and syncope.   GI Negative Abdominal pain and diarrhea.   Endocrine Negative Cold intolerance and heat intolerance.   Neuro Negative Tremors.   Psych Negative Anxiety and depression.   Integumentary Negative Frequent skin infections, pigment change and rash.   Hema/Lymph Negative Easy bleeding and easy bruising.           PHYSICAL EXAM    Pulse 78   Temp 97.4 °F (36.3 °C)   Resp 20   Ht 6' 1\" (1.854 m)   Wt 185 lb (83.9 kg)   SpO2 100%   BMI 24.41 kg/m²        Constitutional Normal Overall appearance - Normal.   Psychiatric Normal Orientation - Oriented to time, place, person & situation. Appropriate mood and affect.   Neck Exam Normal Inspection - Normal. Palpation - Normal. Parotid gland - Normal. Thyroid gland - Normal.   Eyes Normal Conjunctiva - Right: Normal, Left: Normal. Pupil - Right: Normal, Left: Normal. Fundus - Right: Normal, Left: Normal.   Neurological Normal Memory - Normal. Cranial nerves - Cranial nerves II through XII grossly intact.   Head/Face Normal Facial features - Normal. Eyebrows - Normal. Skull - Normal.        Nasopharynx Normal External nose - Normal. Lips/teeth/gums - Normal. Tonsils - Normal. Oropharynx - Normal.   Ears Normal Inspection - Right: Normal, Left: Normal. Canal - Right: Normal, Left: Normal. TM - Right: Normal, Left: Tiny perforation anterior tympanic membrane.  Dry no  infection   Skin Normal Inspection - Normal.        Lymph Detail Normal Submental. Submandibular. Anterior cervical. Posterior cervical. Supraclavicular.        Nose/Mouth/Throat Normal External nose - Normal. Lips/teeth/gums - Normal. Tonsils - Normal. Oropharynx - Normal.   Nose/Mouth/Throat Normal Nares - Right: Normal Left: Normal. Septum -Normal  Turbinates - Right: Normal, Left: Normal.       Current Outpatient Medications:     ciprofloxacin 500 MG Oral Tab, Take 1 tablet (500 mg total) by mouth every 12 (twelve) hours., Disp: 14 tablet, Rfl: 0    tobramycin-dexamethasone 0.3-0.1 % Ophthalmic Suspension, Place 3 drops in ear(s) every 8 (eight) hours., Disp: 10 mL, Rfl: 0    OMEPRAZOLE 20 MG Oral Capsule Delayed Release, TAKE 1 CAPSULE(20 MG) BY MOUTH BEFORE BREAKFAST, Disp: 90 capsule, Rfl: 3  ASSESSMENT AND PLAN    1. Perforation of left tympanic membrane  Tiny perforation of the anterior tympanic membrane on the left.  Dry. Infection appears to have resolved completely.  Strict water precautions return to see me in 6 months with audiogram.        This note was prepared using [employer] voice recognition dictation software. As a result errors may occur. When identified these errors have been corrected. While every attempt is made to correct errors during dictation discrepancies may still exist    Shoaib Roldan MD    9/3/2024    1:33 PM

## 2024-09-23 ENCOUNTER — OFFICE VISIT (OUTPATIENT)
Dept: FAMILY MEDICINE CLINIC | Facility: CLINIC | Age: 44
End: 2024-09-23
Payer: COMMERCIAL

## 2024-09-23 VITALS
HEIGHT: 73 IN | WEIGHT: 188.38 LBS | OXYGEN SATURATION: 97 % | RESPIRATION RATE: 16 BRPM | BODY MASS INDEX: 24.97 KG/M2 | HEART RATE: 63 BPM | SYSTOLIC BLOOD PRESSURE: 120 MMHG | DIASTOLIC BLOOD PRESSURE: 80 MMHG

## 2024-09-23 DIAGNOSIS — J30.2 SEASONAL ALLERGIES: Primary | ICD-10-CM

## 2024-09-23 PROCEDURE — 99213 OFFICE O/P EST LOW 20 MIN: CPT | Performed by: FAMILY MEDICINE

## 2024-09-23 PROCEDURE — 3079F DIAST BP 80-89 MM HG: CPT | Performed by: FAMILY MEDICINE

## 2024-09-23 PROCEDURE — 3008F BODY MASS INDEX DOCD: CPT | Performed by: FAMILY MEDICINE

## 2024-09-23 PROCEDURE — 3074F SYST BP LT 130 MM HG: CPT | Performed by: FAMILY MEDICINE

## 2024-09-23 RX ORDER — AZELASTINE 1 MG/ML
1 SPRAY, METERED NASAL 2 TIMES DAILY
Qty: 30 ML | Refills: 2 | Status: SHIPPED | OUTPATIENT
Start: 2024-09-23

## 2024-09-23 NOTE — PROGRESS NOTES
Chief Complaint   Patient presents with    Allergies     Patient here for allergies      HPI:   Sherman Hawkins is a 44 year old male who presents to the office for eval of allergies.    Also impacted his ears.  H/o TM rupture when he was 20.    Was feeling pressure in the L ear.  Did end up going underwater [place] a [place] and a few days later the ear started giving him pain.  Diagnosed with swimmer's ear.  Treated, but persisted.  Also saw ENT.   TM was ruptured.    Infection eventually went away, but the ruptured TM persisted.     Taking Flonase from [employer], and fexofenadine.    Still sneezing.  Stuffy nose.   Changed filter in air purifier next to his bed.    REVIEW OF SYSTEMS:   Pertinent items are noted in HPI.  EXAM:   /80   Pulse 63   Resp 16   Ht 6' 1\" (1.854 m)   Wt 188 lb 6 oz (85.4 kg)   SpO2 97%   BMI 24.85 kg/m²     General appearance - alert, well appearing, and in no distress  Ears - L TM with rupture.  R ear normal  Nose - clear rhinorrhea  Mouth - mucous membranes moist, pharynx normal without lesions  ASSESSMENT AND PLAN:     Sherman Hawkins was seen in the office today:  had concerns including Allergies (Patient here for allergies).    1. Seasonal allergies  Seasonal allergies  Not responding to the usual first line antihistamines and nasal steroids  Add nasal antihistamine spray  Can try netti pot or nasal saline  If persists despite all this, consider Singulair (montelukast).  We did discuss this med tends to have a little more side effects.   - azelastine 0.1 % Nasal Solution; 1 spray by Nasal route 2 (two) times daily.  Dispense: 30 mL; Refill: 2      Tj Hyatt M.D.   [facility] 3  09/23/24

## 2025-02-08 ENCOUNTER — TELEPHONE (OUTPATIENT)
Dept: FAMILY MEDICINE CLINIC | Facility: CLINIC | Age: 45
End: 2025-02-08

## 2025-02-08 DIAGNOSIS — Z00.00 LABORATORY EXAM ORDERED AS PART OF ROUTINE GENERAL MEDICAL EXAMINATION: Primary | ICD-10-CM

## 2025-02-08 NOTE — TELEPHONE ENCOUNTER
Pt is coming in for an apt with Dr Hyatt on 5/21 and would like to get some labs done before that     Pts MyChart message    \"Then lets just keep the appointment date and switch to exam. Does that work? My stress level is much lower prior to summer so I'd like to get blood samples before then to see how they look.

## (undated) NOTE — MR AVS SNAPSHOT
[address] [facility] [address]  [facility],  [address]  [address] [place] [address] [phone]               Thank you for choosing us for your health care visit with Ray Robbins MD.  We are glad to serve you and happy to provide you with this s Assoc Dx:  Lipid screening [E92.773]                 Referral Details     Referred By    Referred To    Kimber Gamez MD   [address] \[facility]\"" [id number]   Phone:  [phone]   Fax:  [phone]    Diagnoses:  Chronic midline low b Your unique [employer] Access Code: [email or web address]  Expires: 3/25/2017  7:16 AM    If you have questions, you can call [phone] to talk to our [facility] Staff. Remember, [employer] is NOT to be used for urgent needs. For medical emergencies, dial 911.